# Patient Record
Sex: FEMALE | Race: WHITE | NOT HISPANIC OR LATINO | URBAN - METROPOLITAN AREA
[De-identification: names, ages, dates, MRNs, and addresses within clinical notes are randomized per-mention and may not be internally consistent; named-entity substitution may affect disease eponyms.]

---

## 2017-01-25 ENCOUNTER — OUTPATIENT (OUTPATIENT)
Dept: OUTPATIENT SERVICES | Facility: HOSPITAL | Age: 72
LOS: 1 days | Discharge: HOME | End: 2017-01-25

## 2017-06-27 DIAGNOSIS — K64.8 OTHER HEMORRHOIDS: ICD-10-CM

## 2017-06-27 DIAGNOSIS — Z80.0 FAMILY HISTORY OF MALIGNANT NEOPLASM OF DIGESTIVE ORGANS: ICD-10-CM

## 2017-06-27 DIAGNOSIS — E66.9 OBESITY, UNSPECIFIED: ICD-10-CM

## 2017-06-27 DIAGNOSIS — I10 ESSENTIAL (PRIMARY) HYPERTENSION: ICD-10-CM

## 2017-06-27 DIAGNOSIS — D12.2 BENIGN NEOPLASM OF ASCENDING COLON: ICD-10-CM

## 2017-06-27 DIAGNOSIS — Z12.11 ENCOUNTER FOR SCREENING FOR MALIGNANT NEOPLASM OF COLON: ICD-10-CM

## 2017-06-27 DIAGNOSIS — E78.00 PURE HYPERCHOLESTEROLEMIA, UNSPECIFIED: ICD-10-CM

## 2017-06-27 DIAGNOSIS — K57.30 DIVERTICULOSIS OF LARGE INTESTINE WITHOUT PERFORATION OR ABSCESS WITHOUT BLEEDING: ICD-10-CM

## 2017-06-27 DIAGNOSIS — I25.10 ATHEROSCLEROTIC HEART DISEASE OF NATIVE CORONARY ARTERY WITHOUT ANGINA PECTORIS: ICD-10-CM

## 2017-06-27 DIAGNOSIS — E11.9 TYPE 2 DIABETES MELLITUS WITHOUT COMPLICATIONS: ICD-10-CM

## 2018-01-29 ENCOUNTER — OUTPATIENT (OUTPATIENT)
Dept: OUTPATIENT SERVICES | Facility: HOSPITAL | Age: 73
LOS: 1 days | Discharge: HOME | End: 2018-01-29

## 2018-01-29 ENCOUNTER — APPOINTMENT (OUTPATIENT)
Dept: HEMATOLOGY ONCOLOGY | Facility: CLINIC | Age: 73
End: 2018-01-29

## 2018-01-29 VITALS
WEIGHT: 160 LBS | DIASTOLIC BLOOD PRESSURE: 80 MMHG | HEIGHT: 63 IN | SYSTOLIC BLOOD PRESSURE: 160 MMHG | BODY MASS INDEX: 28.35 KG/M2 | HEART RATE: 82 BPM | TEMPERATURE: 97.3 F | RESPIRATION RATE: 12 BRPM

## 2018-01-29 LAB
BASOPHILS # BLD: 0.03 TH/MM3
BASOPHILS NFR BLD: 0.3 %
EOSINOPHIL # BLD: 0.14 TH/MM3
EOSINOPHIL NFR BLD: 1.4 %
ERYTHROCYTE [DISTWIDTH] IN BLOOD BY AUTOMATED COUNT: 13.3 %
GRANULOCYTES # BLD: 5.44 TH/MM3
GRANULOCYTES NFR BLD: 55.8 %
HCT VFR BLD AUTO: 38.3 %
HGB BLD-MCNC: 12.9 G/DL
IMM GRANULOCYTES # BLD: 0.05 TH/MM3
IMM GRANULOCYTES NFR BLD: 0.5 %
LYMPHOCYTES # BLD: 3.58 TH/MM3
LYMPHOCYTES NFR BLD: 36.7 %
MCH RBC QN AUTO: 28.3 PG
MCHC RBC AUTO-ENTMCNC: 33.7 G/DL
MCV RBC AUTO: 84 FL
MONOCYTES # BLD: 0.52 TH/MM3
MONOCYTES NFR BLD: 5.3 %
PLATELET # BLD: 172 TH/MM3
PMV BLD AUTO: 10.4 FL
RBC # BLD AUTO: 4.56 MIL/MM3
WBC # BLD: 9.76 TH/MM3

## 2018-01-30 DIAGNOSIS — D47.2 MONOCLONAL GAMMOPATHY: ICD-10-CM

## 2018-01-30 LAB
ALBUMIN SERPL-MCNC: 4.5 G/DL
ALBUMIN/GLOB SERPL: 1.88
ALP SERPL-CCNC: 61 IU/L
ALT SERPL-CCNC: 70 IU/L
ANION GAP SERPL CALC-SCNC: 11 MEQ/L
AST SERPL-CCNC: 65 IU/L
BILIRUB SERPL-MCNC: 1.5 MG/DL
BUN SERPL-MCNC: 20 MG/DL
BUN/CREAT SERPL: 32.8 %
CALCIUM SERPL-MCNC: 10.2 MG/DL
CANCER AG125 SERPL-ACNC: 10 U/ML
CHLORIDE SERPL-SCNC: 103 MEQ/L
CO2 SERPL-SCNC: 24 MEQ/L
CREAT SERPL-MCNC: 0.61 MG/DL
GFR SERPL CREATININE-BSD FRML MDRD: 96
GLUCOSE SERPL-MCNC: 149 MG/DL
POTASSIUM SERPL-SCNC: 4.4 MMOL/L
PROT SERPL-MCNC: 6.9 G/DL
SODIUM SERPL-SCNC: 138 MEQ/L

## 2018-01-30 RX ORDER — ISOSORBIDE MONONITRATE 60 MG
60 TABLET, EXTENDED RELEASE 24 HR ORAL
Refills: 0 | Status: ACTIVE | COMMUNITY

## 2018-01-30 RX ORDER — ATORVASTATIN CALCIUM 20 MG/1
20 TABLET, FILM COATED ORAL
Refills: 0 | Status: ACTIVE | COMMUNITY

## 2018-01-30 RX ORDER — GLIMEPIRIDE 1 MG/1
1 TABLET ORAL
Refills: 0 | Status: ACTIVE | COMMUNITY

## 2018-01-30 RX ORDER — METOPROLOL TARTRATE 50 MG/1
50 TABLET ORAL
Refills: 0 | Status: ACTIVE | COMMUNITY

## 2018-01-30 RX ORDER — CLOPIDOGREL 75 MG/1
75 TABLET, FILM COATED ORAL
Refills: 0 | Status: ACTIVE | COMMUNITY

## 2018-01-30 RX ORDER — OMEGA-3/DHA/EPA/FISH OIL 300-1000MG
1000 CAPSULE,DELAYED RELEASE (ENTERIC COATED) ORAL
Refills: 0 | Status: ACTIVE | COMMUNITY

## 2018-01-30 RX ORDER — VALSARTAN 160 MG/1
160 TABLET ORAL
Refills: 0 | Status: ACTIVE | COMMUNITY

## 2018-01-30 RX ORDER — OMEPRAZOLE 40 MG/1
40 CAPSULE, DELAYED RELEASE ORAL
Refills: 0 | Status: ACTIVE | COMMUNITY

## 2018-01-30 RX ORDER — SITAGLIPTIN AND METFORMIN HYDROCHLORIDE 50; 1000 MG/1; MG/1
50-1000 TABLET, FILM COATED ORAL
Refills: 0 | Status: ACTIVE | COMMUNITY

## 2018-02-22 ENCOUNTER — EMERGENCY (EMERGENCY)
Facility: HOSPITAL | Age: 73
LOS: 0 days | Discharge: HOME | End: 2018-02-22
Attending: EMERGENCY MEDICINE | Admitting: INTERNAL MEDICINE

## 2018-02-22 VITALS
RESPIRATION RATE: 18 BRPM | DIASTOLIC BLOOD PRESSURE: 64 MMHG | SYSTOLIC BLOOD PRESSURE: 136 MMHG | TEMPERATURE: 96 F | HEART RATE: 84 BPM | OXYGEN SATURATION: 99 %

## 2018-02-22 VITALS
OXYGEN SATURATION: 98 % | SYSTOLIC BLOOD PRESSURE: 137 MMHG | TEMPERATURE: 98 F | RESPIRATION RATE: 18 BRPM | HEART RATE: 88 BPM | DIASTOLIC BLOOD PRESSURE: 78 MMHG

## 2018-02-22 DIAGNOSIS — R11.2 NAUSEA WITH VOMITING, UNSPECIFIED: ICD-10-CM

## 2018-02-22 DIAGNOSIS — R53.1 WEAKNESS: ICD-10-CM

## 2018-02-22 DIAGNOSIS — I25.10 ATHEROSCLEROTIC HEART DISEASE OF NATIVE CORONARY ARTERY WITHOUT ANGINA PECTORIS: ICD-10-CM

## 2018-02-22 DIAGNOSIS — R61 GENERALIZED HYPERHIDROSIS: ICD-10-CM

## 2018-02-22 DIAGNOSIS — I10 ESSENTIAL (PRIMARY) HYPERTENSION: ICD-10-CM

## 2018-02-22 DIAGNOSIS — Z79.84 LONG TERM (CURRENT) USE OF ORAL HYPOGLYCEMIC DRUGS: ICD-10-CM

## 2018-02-22 DIAGNOSIS — E78.00 PURE HYPERCHOLESTEROLEMIA, UNSPECIFIED: ICD-10-CM

## 2018-02-22 DIAGNOSIS — Z79.899 OTHER LONG TERM (CURRENT) DRUG THERAPY: ICD-10-CM

## 2018-02-22 DIAGNOSIS — E11.9 TYPE 2 DIABETES MELLITUS WITHOUT COMPLICATIONS: ICD-10-CM

## 2018-02-22 LAB
ALBUMIN SERPL ELPH-MCNC: 4.3 G/DL — SIGNIFICANT CHANGE UP (ref 3–5.5)
ALP SERPL-CCNC: 66 U/L — SIGNIFICANT CHANGE UP (ref 30–115)
ALT FLD-CCNC: 108 U/L — HIGH (ref 0–41)
ANION GAP SERPL CALC-SCNC: 12 MMOL/L — SIGNIFICANT CHANGE UP (ref 7–14)
AST SERPL-CCNC: 117 U/L — HIGH (ref 0–41)
B-TYPE NATRIURETIC PEPTIDE BNP RESULT: 29 PG/ML — SIGNIFICANT CHANGE UP (ref 0–99)
BASOPHILS # BLD AUTO: 0.02 K/UL — SIGNIFICANT CHANGE UP (ref 0–0.2)
BASOPHILS NFR BLD AUTO: 0.2 % — SIGNIFICANT CHANGE UP (ref 0–1)
BILIRUB SERPL-MCNC: 2.6 MG/DL — HIGH (ref 0.2–1.2)
BUN SERPL-MCNC: 22 MG/DL — HIGH (ref 10–20)
CALCIUM SERPL-MCNC: 9.6 MG/DL — SIGNIFICANT CHANGE UP (ref 8.5–10.1)
CHLORIDE SERPL-SCNC: 99 MMOL/L — SIGNIFICANT CHANGE UP (ref 98–110)
CK MB BLD-MCNC: 2 % — SIGNIFICANT CHANGE UP (ref 0–4)
CK MB CFR SERPL CALC: 1.3 NG/ML — SIGNIFICANT CHANGE UP (ref 0.6–6.3)
CK SERPL-CCNC: 53 U/L — SIGNIFICANT CHANGE UP (ref 0–225)
CO2 SERPL-SCNC: 19 MMOL/L — SIGNIFICANT CHANGE UP (ref 17–32)
CREAT SERPL-MCNC: 0.9 MG/DL — SIGNIFICANT CHANGE UP (ref 0.7–1.5)
EOSINOPHIL # BLD AUTO: 0.02 K/UL — SIGNIFICANT CHANGE UP (ref 0–0.7)
EOSINOPHIL NFR BLD AUTO: 0.2 % — SIGNIFICANT CHANGE UP (ref 0–8)
GLUCOSE SERPL-MCNC: 262 MG/DL — HIGH (ref 70–110)
HCT VFR BLD CALC: 39.4 % — SIGNIFICANT CHANGE UP (ref 37–47)
HGB BLD-MCNC: 13.6 G/DL — LOW (ref 14–18)
IMM GRANULOCYTES NFR BLD AUTO: 0.7 % — HIGH (ref 0.1–0.3)
LIDOCAIN IGE QN: 37 U/L — SIGNIFICANT CHANGE UP (ref 7–60)
LYMPHOCYTES # BLD AUTO: 1.01 K/UL — LOW (ref 1.2–3.4)
LYMPHOCYTES # BLD AUTO: 9.4 % — LOW (ref 20.5–51.1)
MCHC RBC-ENTMCNC: 28.3 PG — SIGNIFICANT CHANGE UP (ref 27–31)
MCHC RBC-ENTMCNC: 34.5 G/DL — SIGNIFICANT CHANGE UP (ref 32–37)
MCV RBC AUTO: 82.1 FL — SIGNIFICANT CHANGE UP (ref 81–91)
MONOCYTES # BLD AUTO: 0.25 K/UL — SIGNIFICANT CHANGE UP (ref 0.1–0.6)
MONOCYTES NFR BLD AUTO: 2.3 % — SIGNIFICANT CHANGE UP (ref 1.7–9.3)
NEUTROPHILS # BLD AUTO: 9.39 K/UL — HIGH (ref 1.4–6.5)
NEUTROPHILS NFR BLD AUTO: 87.2 % — HIGH (ref 42.2–75.2)
PLATELET # BLD AUTO: 167 K/UL — SIGNIFICANT CHANGE UP (ref 130–400)
POTASSIUM SERPL-MCNC: 4.8 MMOL/L — SIGNIFICANT CHANGE UP (ref 3.5–5)
POTASSIUM SERPL-SCNC: 4.8 MMOL/L — SIGNIFICANT CHANGE UP (ref 3.5–5)
PROT SERPL-MCNC: 7.1 G/DL — SIGNIFICANT CHANGE UP (ref 6–8)
RBC # BLD: 4.8 M/UL — SIGNIFICANT CHANGE UP (ref 4.2–5.4)
RBC # FLD: 12.8 % — SIGNIFICANT CHANGE UP (ref 11.5–14.5)
SODIUM SERPL-SCNC: 130 MMOL/L — LOW (ref 135–146)
TROPONIN I SERPL-MCNC: <0.02 NG/ML — SIGNIFICANT CHANGE UP (ref 0–0.05)
WBC # BLD: 10.76 K/UL — SIGNIFICANT CHANGE UP (ref 4.8–10.8)
WBC # FLD AUTO: 10.76 K/UL — SIGNIFICANT CHANGE UP (ref 4.8–10.8)

## 2018-02-22 NOTE — ED PROVIDER NOTE - PROGRESS NOTE DETAILS
Family at bedside, want to take pt home. Pt is at baseline mental status. No vomiting in the ED. Tolerating PO. Labs reviewed. Family is refusing CT. Will follow up with PMD and cardiology. Will discharge.

## 2018-02-22 NOTE — ED ADULT TRIAGE NOTE - CHIEF COMPLAINT QUOTE
[facility] from [facility], pt was lethargic, +vomiting, was pale and diaphoretic, given juice and now pt back to herself

## 2018-02-22 NOTE — ED PROVIDER NOTE - OBJECTIVE STATEMENT
72 y F pmh including pmh including CAD, DM, colon and ovarian ca, presents w / nausea and vomiting noted today and at a class for adult developmentally delayed, they noted that patient had an episode of seeming not herself. patient says she just didn't feel well. no cp or sob. no fever.

## 2018-02-22 NOTE — ED PROVIDER NOTE - PHYSICAL EXAMINATION
CONSTITUTIONAL: Well-developed; well-nourished; in no acute distress.   SKIN: warm, dry  HEAD: Normocephalic; atraumatic.  EYES: no conj injection  ENT: No nasal discharge; airway clear.  NECK: Supple; non tender.  CARD: S1, S2 normal; no murmurs, gallops, or rubs. Regular rate and rhythm.   RESP: No wheezes, rales or rhonchi.  ABD: soft ntnd  EXT: Normal ROM.  No clubbing, cyanosis or edema.   LYMPH: No acute cervical adenopathy.  NEURO: Alert, oriented, grossly unremarkable  PSYCH: Cooperative, appropriate.

## 2018-02-22 NOTE — ED PROVIDER NOTE - NS ED ROS FT
Eyes:  No visual changes  ENMT:  No hearing changes  Cardiac:  No chest pain, SOB   Respiratory:  No cough or respiratory distress  GI:  see hpi  :  No dysuria  MS:  No back pain.  Neuro:  No headache  Skin:  No skin rash.   Endocrine: No history of thyroid disease

## 2018-02-22 NOTE — ED ADULT NURSE NOTE - OBJECTIVE STATEMENT
Pt states she was on a trip and began to become diaphoretic and was lowered to floor, pt was given glucerna & orange juice and fs checked 280 at scene and pt vomited nonbloody emesis after. Pt alert and orientedx3, VSS and afebrile. Safety maintained and hourly rounding performed. Will continue with plan of care. fs now 257.

## 2018-02-22 NOTE — ED ADULT NURSE REASSESSMENT NOTE - NS ED NURSE REASSESS COMMENT FT1
Pts alert, fs 257 at this time. no active vomiting. VSS and afebrile. explained wait of time to pt and family.  Safety maintained and hourly rounding performed. Will continue with plan of care.

## 2018-02-22 NOTE — ED PROVIDER NOTE - ATTENDING CONTRIBUTION TO CARE
72 y.o. female, PMH noted, sent from "[facility]" for evaluation after she had an episode where she turned pale and diaphoretic, was given juice and glucose tablet. Symptoms improved after that, but she vomited once. Pt denies any complains at this time. Pt states she took her Janumet and ate breakfast, but vomited once after breakfast. On exam, pt is awake and alert, lungs CTA B/L, CV S1S2 regular, abdomen soft/NT/ND/(+)BS, ext (-) edema. Will do labs, UA, CT, and reevaluate.

## 2018-04-09 ENCOUNTER — APPOINTMENT (OUTPATIENT)
Dept: SURGERY | Facility: CLINIC | Age: 73
End: 2018-04-09

## 2018-04-18 ENCOUNTER — OUTPATIENT (OUTPATIENT)
Dept: OUTPATIENT SERVICES | Facility: HOSPITAL | Age: 73
LOS: 1 days | Discharge: HOME | End: 2018-04-18

## 2018-04-18 VITALS
DIASTOLIC BLOOD PRESSURE: 72 MMHG | SYSTOLIC BLOOD PRESSURE: 141 MMHG | TEMPERATURE: 97 F | WEIGHT: 160.06 LBS | RESPIRATION RATE: 18 BRPM | OXYGEN SATURATION: 97 % | HEIGHT: 60 IN

## 2018-04-18 DIAGNOSIS — E66.9 OBESITY, UNSPECIFIED: ICD-10-CM

## 2018-04-18 DIAGNOSIS — I10 ESSENTIAL (PRIMARY) HYPERTENSION: ICD-10-CM

## 2018-04-18 DIAGNOSIS — I25.10 ATHEROSCLEROTIC HEART DISEASE OF NATIVE CORONARY ARTERY WITHOUT ANGINA PECTORIS: ICD-10-CM

## 2018-04-18 DIAGNOSIS — C80.1 MALIGNANT (PRIMARY) NEOPLASM, UNSPECIFIED: ICD-10-CM

## 2018-04-18 DIAGNOSIS — R22.9 LOCALIZED SWELLING, MASS AND LUMP, UNSPECIFIED: ICD-10-CM

## 2018-04-18 DIAGNOSIS — E78.00 PURE HYPERCHOLESTEROLEMIA, UNSPECIFIED: ICD-10-CM

## 2018-04-18 DIAGNOSIS — I21.3 ST ELEVATION (STEMI) MYOCARDIAL INFARCTION OF UNSPECIFIED SITE: ICD-10-CM

## 2018-04-18 DIAGNOSIS — E11.9 TYPE 2 DIABETES MELLITUS WITHOUT COMPLICATIONS: ICD-10-CM

## 2018-04-18 DIAGNOSIS — K21.9 GASTRO-ESOPHAGEAL REFLUX DISEASE WITHOUT ESOPHAGITIS: ICD-10-CM

## 2018-04-18 DIAGNOSIS — Z01.818 ENCOUNTER FOR OTHER PREPROCEDURAL EXAMINATION: ICD-10-CM

## 2018-04-18 DIAGNOSIS — Z90.710 ACQUIRED ABSENCE OF BOTH CERVIX AND UTERUS: Chronic | ICD-10-CM

## 2018-04-18 LAB
ALBUMIN SERPL ELPH-MCNC: 4.8 G/DL — SIGNIFICANT CHANGE UP (ref 3.5–5.2)
ALP SERPL-CCNC: 78 U/L — SIGNIFICANT CHANGE UP (ref 30–115)
ALT FLD-CCNC: 95 U/L — HIGH (ref 0–41)
ANION GAP SERPL CALC-SCNC: 15 MMOL/L — HIGH (ref 7–14)
APPEARANCE UR: CLEAR — SIGNIFICANT CHANGE UP
APTT BLD: 26.2 SEC — LOW (ref 27–39.2)
AST SERPL-CCNC: 76 U/L — HIGH (ref 0–41)
BASOPHILS # BLD AUTO: 0.05 K/UL — SIGNIFICANT CHANGE UP (ref 0–0.2)
BASOPHILS NFR BLD AUTO: 0.6 % — SIGNIFICANT CHANGE UP (ref 0–1)
BILIRUB SERPL-MCNC: 1.5 MG/DL — HIGH (ref 0.2–1.2)
BILIRUB UR-MCNC: NEGATIVE — SIGNIFICANT CHANGE UP
BUN SERPL-MCNC: 22 MG/DL — HIGH (ref 10–20)
CALCIUM SERPL-MCNC: 9.9 MG/DL — SIGNIFICANT CHANGE UP (ref 8.5–10.1)
CHLORIDE SERPL-SCNC: 102 MMOL/L — SIGNIFICANT CHANGE UP (ref 98–110)
CO2 SERPL-SCNC: 24 MMOL/L — SIGNIFICANT CHANGE UP (ref 17–32)
COLOR SPEC: YELLOW — SIGNIFICANT CHANGE UP
CREAT SERPL-MCNC: 0.7 MG/DL — SIGNIFICANT CHANGE UP (ref 0.7–1.5)
DIFF PNL FLD: (no result)
EOSINOPHIL # BLD AUTO: 0.12 K/UL — SIGNIFICANT CHANGE UP (ref 0–0.7)
EOSINOPHIL NFR BLD AUTO: 1.4 % — SIGNIFICANT CHANGE UP (ref 0–8)
GLUCOSE SERPL-MCNC: 118 MG/DL — HIGH (ref 70–99)
GLUCOSE UR QL: 100 MG/DL
HCT VFR BLD CALC: 39.7 % — SIGNIFICANT CHANGE UP (ref 37–47)
HGB BLD-MCNC: 13.4 G/DL — SIGNIFICANT CHANGE UP (ref 12–16)
IMM GRANULOCYTES NFR BLD AUTO: 0.8 % — HIGH (ref 0.1–0.3)
INR BLD: 0.99 RATIO — SIGNIFICANT CHANGE UP (ref 0.65–1.3)
KETONES UR-MCNC: NEGATIVE — SIGNIFICANT CHANGE UP
LEUKOCYTE ESTERASE UR-ACNC: NEGATIVE — SIGNIFICANT CHANGE UP
LYMPHOCYTES # BLD AUTO: 2.97 K/UL — SIGNIFICANT CHANGE UP (ref 1.2–3.4)
LYMPHOCYTES # BLD AUTO: 34.5 % — SIGNIFICANT CHANGE UP (ref 20.5–51.1)
MCHC RBC-ENTMCNC: 28.3 PG — SIGNIFICANT CHANGE UP (ref 27–31)
MCHC RBC-ENTMCNC: 33.8 G/DL — SIGNIFICANT CHANGE UP (ref 32–37)
MCV RBC AUTO: 83.8 FL — SIGNIFICANT CHANGE UP (ref 81–99)
MONOCYTES # BLD AUTO: 0.41 K/UL — SIGNIFICANT CHANGE UP (ref 0.1–0.6)
MONOCYTES NFR BLD AUTO: 4.8 % — SIGNIFICANT CHANGE UP (ref 1.7–9.3)
NEUTROPHILS # BLD AUTO: 4.98 K/UL — SIGNIFICANT CHANGE UP (ref 1.4–6.5)
NEUTROPHILS NFR BLD AUTO: 57.9 % — SIGNIFICANT CHANGE UP (ref 42.2–75.2)
NITRITE UR-MCNC: NEGATIVE — SIGNIFICANT CHANGE UP
PH UR: 5.5 — SIGNIFICANT CHANGE UP (ref 5–8)
PLATELET # BLD AUTO: 192 K/UL — SIGNIFICANT CHANGE UP (ref 130–400)
POTASSIUM SERPL-MCNC: 4.9 MMOL/L — SIGNIFICANT CHANGE UP (ref 3.5–5)
POTASSIUM SERPL-SCNC: 4.9 MMOL/L — SIGNIFICANT CHANGE UP (ref 3.5–5)
PROT SERPL-MCNC: 7.6 G/DL — SIGNIFICANT CHANGE UP (ref 6–8)
PROT UR-MCNC: NEGATIVE MG/DL — SIGNIFICANT CHANGE UP
PROTHROM AB SERPL-ACNC: 10.7 SEC — SIGNIFICANT CHANGE UP (ref 9.95–12.87)
RBC # BLD: 4.74 M/UL — SIGNIFICANT CHANGE UP (ref 4.2–5.4)
RBC # FLD: 12.7 % — SIGNIFICANT CHANGE UP (ref 11.5–14.5)
SODIUM SERPL-SCNC: 141 MMOL/L — SIGNIFICANT CHANGE UP (ref 135–146)
SP GR SPEC: 1.02 — SIGNIFICANT CHANGE UP (ref 1.01–1.03)
UROBILINOGEN FLD QL: 0.2 MG/DL — SIGNIFICANT CHANGE UP (ref 0.2–0.2)
WBC # BLD: 8.6 K/UL — SIGNIFICANT CHANGE UP (ref 4.8–10.8)
WBC # FLD AUTO: 8.6 K/UL — SIGNIFICANT CHANGE UP (ref 4.8–10.8)

## 2018-04-18 RX ORDER — ATORVASTATIN CALCIUM 80 MG/1
1 TABLET, FILM COATED ORAL
Qty: 0 | Refills: 0 | COMMUNITY

## 2018-04-18 RX ORDER — SITAGLIPTIN AND METFORMIN HYDROCHLORIDE 500; 50 MG/1; MG/1
1 TABLET, FILM COATED ORAL
Qty: 0 | Refills: 0 | COMMUNITY

## 2018-04-18 RX ORDER — METOPROLOL TARTRATE 50 MG
1 TABLET ORAL
Qty: 0 | Refills: 0 | COMMUNITY

## 2018-04-18 NOTE — H&P PST ADULT - NSANTHOSAYNRD_GEN_A_CORE
No. DANIEL screening performed.  STOP BANG Legend: 0-2 = LOW Risk; 3-4 = INTERMEDIATE Risk; 5-8 = HIGH Risk

## 2018-04-18 NOTE — H&P PST ADULT - PMH
Abnormal liver function test    Cancer  Ovarian cancer s/p chemo 2012  Coronary artery disease involving native coronary artery of native heart, angina presence unspecified  MI  2011  Diabetes    Gastroesophageal reflux disease without esophagitis    High cholesterol    HTN (hypertension)    Obesity (BMI 30-39.9)

## 2018-04-18 NOTE — H&P PST ADULT - HISTORY OF PRESENT ILLNESS
72 y/o female accompanied by sister in law (who provided history),  reports h/o ovarian cancer with routine CT abd f/u - found to have abdominal nodules; elected for procedure. Denies chest pain, palp, SOB, cough, fever, recent URI/UTI.  Denies DONALDSON - goes to [facility] once a week.  Sister in law reports she is primary care taker for patient; that patient "is just a little slow", and that patient has been [facility]  [facility] 10[facility] yrs. Patient was seen and treated in [facility] ED for "low glucose." CXR / Labs done.   Last Stress test 2016: no ischemia.

## 2018-04-18 NOTE — H&P PST ADULT - FAMILY HISTORY
Mother  Still living? Unknown  Cancer, colon, Age at diagnosis: Age Unknown     Father  Still living? Unknown  Cancer, Age at diagnosis: Age Unknown     Sibling  Still living? Unknown  Cancer, Age at diagnosis: Age Unknown

## 2018-04-27 ENCOUNTER — RESULT REVIEW (OUTPATIENT)
Age: 73
End: 2018-04-27

## 2018-04-27 ENCOUNTER — OUTPATIENT (OUTPATIENT)
Dept: OUTPATIENT SERVICES | Facility: HOSPITAL | Age: 73
LOS: 1 days | Discharge: HOME | End: 2018-04-27

## 2018-04-27 VITALS
TEMPERATURE: 99 F | DIASTOLIC BLOOD PRESSURE: 88 MMHG | WEIGHT: 160.06 LBS | SYSTOLIC BLOOD PRESSURE: 170 MMHG | HEART RATE: 60 BPM | RESPIRATION RATE: 18 BRPM

## 2018-04-27 VITALS — HEART RATE: 60 BPM | DIASTOLIC BLOOD PRESSURE: 55 MMHG | SYSTOLIC BLOOD PRESSURE: 106 MMHG | RESPIRATION RATE: 18 BRPM

## 2018-04-27 DIAGNOSIS — Z90.710 ACQUIRED ABSENCE OF BOTH CERVIX AND UTERUS: Chronic | ICD-10-CM

## 2018-04-27 RX ORDER — ISOSORBIDE MONONITRATE 60 MG/1
1 TABLET, EXTENDED RELEASE ORAL
Qty: 0 | Refills: 0 | COMMUNITY

## 2018-04-27 RX ORDER — GLIMEPIRIDE 1 MG
1 TABLET ORAL
Qty: 0 | Refills: 0 | COMMUNITY

## 2018-04-27 RX ORDER — ASPIRIN/CALCIUM CARB/MAGNESIUM 324 MG
1 TABLET ORAL
Qty: 0 | Refills: 0 | COMMUNITY

## 2018-04-27 RX ORDER — ATORVASTATIN CALCIUM 80 MG/1
1 TABLET, FILM COATED ORAL
Qty: 0 | Refills: 0 | COMMUNITY

## 2018-04-27 RX ORDER — METOPROLOL TARTRATE 50 MG
1 TABLET ORAL
Qty: 0 | Refills: 0 | COMMUNITY

## 2018-04-27 RX ORDER — SODIUM CHLORIDE 9 MG/ML
1000 INJECTION, SOLUTION INTRAVENOUS
Qty: 0 | Refills: 0 | Status: DISCONTINUED | OUTPATIENT
Start: 2018-04-27 | End: 2018-05-12

## 2018-04-27 RX ORDER — OMEGA-3 ACID ETHYL ESTERS 1 G
1 CAPSULE ORAL
Qty: 0 | Refills: 0 | COMMUNITY

## 2018-04-27 RX ORDER — SITAGLIPTIN AND METFORMIN HYDROCHLORIDE 500; 50 MG/1; MG/1
1 TABLET, FILM COATED ORAL
Qty: 0 | Refills: 0 | COMMUNITY

## 2018-04-27 RX ORDER — ACETAMINOPHEN WITH CODEINE 300MG-30MG
1 TABLET ORAL
Qty: 12 | Refills: 0 | OUTPATIENT
Start: 2018-04-27 | End: 2018-04-29

## 2018-04-27 RX ORDER — VALSARTAN 80 MG/1
1 TABLET ORAL
Qty: 0 | Refills: 0 | COMMUNITY

## 2018-04-27 RX ORDER — CLOPIDOGREL BISULFATE 75 MG/1
1 TABLET, FILM COATED ORAL
Qty: 0 | Refills: 0 | COMMUNITY

## 2018-04-27 RX ORDER — OMEPRAZOLE 10 MG/1
1 CAPSULE, DELAYED RELEASE ORAL
Qty: 0 | Refills: 0 | COMMUNITY

## 2018-04-27 RX ORDER — ONDANSETRON 8 MG/1
4 TABLET, FILM COATED ORAL ONCE
Qty: 0 | Refills: 0 | Status: DISCONTINUED | OUTPATIENT
Start: 2018-04-27 | End: 2018-05-12

## 2018-04-27 RX ORDER — MORPHINE SULFATE 50 MG/1
2 CAPSULE, EXTENDED RELEASE ORAL
Qty: 0 | Refills: 0 | Status: DISCONTINUED | OUTPATIENT
Start: 2018-04-27 | End: 2018-04-27

## 2018-04-27 RX ORDER — OXYCODONE AND ACETAMINOPHEN 5; 325 MG/1; MG/1
1 TABLET ORAL ONCE
Qty: 0 | Refills: 0 | Status: DISCONTINUED | OUTPATIENT
Start: 2018-04-27 | End: 2018-04-27

## 2018-04-27 RX ADMIN — SODIUM CHLORIDE 50 MILLILITER(S): 9 INJECTION, SOLUTION INTRAVENOUS at 14:03

## 2018-04-27 NOTE — BRIEF OPERATIVE NOTE - PROCEDURE
<<-----Click on this checkbox to enter Procedure Excision of mass of abdomen  04/27/2018  Excision of three abdominal wall masses.  Active  TDINITTO

## 2018-04-27 NOTE — ASU DISCHARGE PLAN (ADULT/PEDIATRIC). - DRESSING FT
Keep area clean & dry;On sunday ok to shower & pat dry;keep paper strips in place till they fall off

## 2018-05-04 LAB — SURGICAL PATHOLOGY STUDY: SIGNIFICANT CHANGE UP

## 2018-05-07 DIAGNOSIS — Z79.84 LONG TERM (CURRENT) USE OF ORAL HYPOGLYCEMIC DRUGS: ICD-10-CM

## 2018-05-07 DIAGNOSIS — K21.9 GASTRO-ESOPHAGEAL REFLUX DISEASE WITHOUT ESOPHAGITIS: ICD-10-CM

## 2018-05-07 DIAGNOSIS — Z90.710 ACQUIRED ABSENCE OF BOTH CERVIX AND UTERUS: ICD-10-CM

## 2018-05-07 DIAGNOSIS — I25.119 ATHEROSCLEROTIC HEART DISEASE OF NATIVE CORONARY ARTERY WITH UNSPECIFIED ANGINA PECTORIS: ICD-10-CM

## 2018-05-07 DIAGNOSIS — Z80.0 FAMILY HISTORY OF MALIGNANT NEOPLASM OF DIGESTIVE ORGANS: ICD-10-CM

## 2018-05-07 DIAGNOSIS — Z85.43 PERSONAL HISTORY OF MALIGNANT NEOPLASM OF OVARY: ICD-10-CM

## 2018-05-07 DIAGNOSIS — Z90.722 ACQUIRED ABSENCE OF OVARIES, BILATERAL: ICD-10-CM

## 2018-05-07 DIAGNOSIS — Z79.02 LONG TERM (CURRENT) USE OF ANTITHROMBOTICS/ANTIPLATELETS: ICD-10-CM

## 2018-05-07 DIAGNOSIS — E11.9 TYPE 2 DIABETES MELLITUS WITHOUT COMPLICATIONS: ICD-10-CM

## 2018-05-07 DIAGNOSIS — E66.9 OBESITY, UNSPECIFIED: ICD-10-CM

## 2018-05-07 DIAGNOSIS — Z79.82 LONG TERM (CURRENT) USE OF ASPIRIN: ICD-10-CM

## 2018-05-07 DIAGNOSIS — I10 ESSENTIAL (PRIMARY) HYPERTENSION: ICD-10-CM

## 2018-05-07 DIAGNOSIS — E78.00 PURE HYPERCHOLESTEROLEMIA, UNSPECIFIED: ICD-10-CM

## 2018-05-07 DIAGNOSIS — I25.2 OLD MYOCARDIAL INFARCTION: ICD-10-CM

## 2018-05-07 DIAGNOSIS — Z92.21 PERSONAL HISTORY OF ANTINEOPLASTIC CHEMOTHERAPY: ICD-10-CM

## 2018-05-07 DIAGNOSIS — C79.2 SECONDARY MALIGNANT NEOPLASM OF SKIN: ICD-10-CM

## 2018-05-07 DIAGNOSIS — R19.00 INTRA-ABDOMINAL AND PELVIC SWELLING, MASS AND LUMP, UNSPECIFIED SITE: ICD-10-CM

## 2018-05-21 ENCOUNTER — APPOINTMENT (OUTPATIENT)
Dept: HEMATOLOGY ONCOLOGY | Facility: CLINIC | Age: 73
End: 2018-05-21

## 2018-05-21 ENCOUNTER — LABORATORY RESULT (OUTPATIENT)
Age: 73
End: 2018-05-21

## 2018-05-21 VITALS
WEIGHT: 161 LBS | TEMPERATURE: 95.7 F | DIASTOLIC BLOOD PRESSURE: 56 MMHG | HEART RATE: 67 BPM | BODY MASS INDEX: 28.53 KG/M2 | RESPIRATION RATE: 14 BRPM | SYSTOLIC BLOOD PRESSURE: 99 MMHG | HEIGHT: 63 IN

## 2018-05-21 DIAGNOSIS — E11.9 TYPE 2 DIABETES MELLITUS W/OUT COMPLICATIONS: ICD-10-CM

## 2018-05-21 DIAGNOSIS — F79 UNSPECIFIED INTELLECTUAL DISABILITIES: ICD-10-CM

## 2018-05-21 DIAGNOSIS — I25.10 ATHEROSCLEROTIC HEART DISEASE OF NATIVE CORONARY ARTERY W/OUT ANGINA PECTORIS: ICD-10-CM

## 2018-05-21 DIAGNOSIS — I10 ESSENTIAL (PRIMARY) HYPERTENSION: ICD-10-CM

## 2018-05-21 LAB
HCT VFR BLD CALC: 38.6 %
HGB BLD-MCNC: 13 G/DL
MCHC RBC-ENTMCNC: 28.1 PG
MCHC RBC-ENTMCNC: 33.7 G/DL
MCV RBC AUTO: 83.5 FL
PLATELET # BLD AUTO: 155 K/UL
PMV BLD: 10.4 FL
RBC # BLD: 4.62 M/UL
RBC # FLD: 12.9 %
WBC # FLD AUTO: 9 K/UL

## 2018-05-22 PROBLEM — I25.10 CORONARY ARTERY DISEASE, ANGINA PRESENCE UNSPECIFIED, UNSPECIFIED VESSEL OR LESION TYPE, UNSPECIFIED WHETHER NATIVE OR TRANSPLANTED HEART: Status: ACTIVE | Noted: 2018-01-29

## 2018-05-22 PROBLEM — E11.9 DIABETES MELLITUS: Status: ACTIVE | Noted: 2018-01-29

## 2018-05-22 PROBLEM — F79 INTELLECTUAL DISABILITY: Status: ACTIVE | Noted: 2018-01-29

## 2018-05-22 PROBLEM — I10 HYPERTENSION, UNSPECIFIED TYPE: Status: ACTIVE | Noted: 2018-01-29

## 2018-05-22 LAB
ALBUMIN SERPL ELPH-MCNC: 4.7 G/DL
ALP BLD-CCNC: 77 U/L
ALT SERPL-CCNC: 58 U/L
ANION GAP SERPL CALC-SCNC: 18 MMOL/L
AST SERPL-CCNC: 50 U/L
BILIRUB SERPL-MCNC: 1.4 MG/DL
BUN SERPL-MCNC: 21 MG/DL
CALCIUM SERPL-MCNC: 10.4 MG/DL
CANCER AG125 SERPL-ACNC: 11 U/ML
CHLORIDE SERPL-SCNC: 99 MMOL/L
CO2 SERPL-SCNC: 24 MMOL/L
CREAT SERPL-MCNC: 0.7 MG/DL
GLUCOSE SERPL-MCNC: 127 MG/DL
POTASSIUM SERPL-SCNC: 4.2 MMOL/L
PROT SERPL-MCNC: 7.3 G/DL
SODIUM SERPL-SCNC: 141 MMOL/L

## 2018-06-15 ENCOUNTER — LABORATORY RESULT (OUTPATIENT)
Age: 73
End: 2018-06-15

## 2018-06-15 ENCOUNTER — RX RENEWAL (OUTPATIENT)
Age: 73
End: 2018-06-15

## 2018-06-15 ENCOUNTER — APPOINTMENT (OUTPATIENT)
Dept: INFUSION THERAPY | Facility: CLINIC | Age: 73
End: 2018-06-15

## 2018-06-15 ENCOUNTER — APPOINTMENT (OUTPATIENT)
Dept: HEMATOLOGY ONCOLOGY | Facility: CLINIC | Age: 73
End: 2018-06-15

## 2018-06-15 LAB
HCT VFR BLD CALC: 38.4 %
HGB BLD-MCNC: 12.8 G/DL
MCHC RBC-ENTMCNC: 28.3 PG
MCHC RBC-ENTMCNC: 33.3 G/DL
MCV RBC AUTO: 84.8 FL
PLATELET # BLD AUTO: 141 K/UL
PMV BLD: 11 FL
RBC # BLD: 4.53 M/UL
RBC # FLD: 12.7 %
WBC # FLD AUTO: 7.74 K/UL

## 2018-06-15 RX ORDER — DIPHENHYDRAMINE HCL 50 MG
50 CAPSULE ORAL ONCE
Qty: 0 | Refills: 0 | Status: COMPLETED | OUTPATIENT
Start: 2018-06-15 | End: 2018-06-15

## 2018-06-15 RX ORDER — ONDANSETRON 8 MG/1
8 TABLET ORAL
Qty: 30 | Refills: 3 | Status: ACTIVE | COMMUNITY
Start: 2018-06-15 | End: 1900-01-01

## 2018-06-15 RX ORDER — PROCHLORPERAZINE MALEATE 10 MG/1
10 TABLET ORAL EVERY 6 HOURS
Qty: 385 | Refills: 3 | Status: ACTIVE | COMMUNITY
Start: 2018-06-15 | End: 1900-01-01

## 2018-06-15 RX ORDER — FAMOTIDINE 10 MG/ML
20 INJECTION INTRAVENOUS ONCE
Qty: 0 | Refills: 0 | Status: COMPLETED | OUTPATIENT
Start: 2018-06-15 | End: 2018-06-15

## 2018-06-15 RX ORDER — PACLITAXEL 6 MG/ML
125 INJECTION, SOLUTION, CONCENTRATE INTRAVENOUS ONCE
Qty: 0 | Refills: 0 | Status: COMPLETED | OUTPATIENT
Start: 2018-06-15 | End: 2018-06-15

## 2018-06-15 RX ORDER — DEXAMETHASONE 0.5 MG/5ML
12 ELIXIR ORAL ONCE
Qty: 0 | Refills: 0 | Status: COMPLETED | OUTPATIENT
Start: 2018-06-15 | End: 2018-06-15

## 2018-06-15 RX ORDER — CARBOPLATIN 50 MG
315 VIAL (EA) INTRAVENOUS ONCE
Qty: 0 | Refills: 0 | Status: COMPLETED | OUTPATIENT
Start: 2018-06-15 | End: 2018-06-15

## 2018-06-15 RX ORDER — ACETAMINOPHEN 500 MG
650 TABLET ORAL ONCE
Qty: 0 | Refills: 0 | Status: COMPLETED | OUTPATIENT
Start: 2018-06-15 | End: 2018-06-15

## 2018-06-15 RX ADMIN — FAMOTIDINE 156 MILLIGRAM(S): 10 INJECTION INTRAVENOUS at 11:00

## 2018-06-15 RX ADMIN — Medication 281.5 MILLIGRAM(S): at 11:01

## 2018-06-15 RX ADMIN — Medication 183 MILLIGRAM(S): at 11:00

## 2018-06-15 RX ADMIN — Medication 650 MILLIGRAM(S): at 11:01

## 2018-06-15 RX ADMIN — PACLITAXEL 270.83 MILLIGRAM(S): 6 INJECTION, SOLUTION, CONCENTRATE INTRAVENOUS at 11:02

## 2018-06-15 RX ADMIN — Medication 153 MILLIGRAM(S): at 11:00

## 2018-06-21 ENCOUNTER — LABORATORY RESULT (OUTPATIENT)
Age: 73
End: 2018-06-21

## 2018-06-21 ENCOUNTER — APPOINTMENT (OUTPATIENT)
Dept: INFUSION THERAPY | Facility: CLINIC | Age: 73
End: 2018-06-21

## 2018-06-21 ENCOUNTER — APPOINTMENT (OUTPATIENT)
Dept: HEMATOLOGY ONCOLOGY | Facility: CLINIC | Age: 73
End: 2018-06-21

## 2018-06-21 VITALS
HEART RATE: 68 BPM | RESPIRATION RATE: 18 BRPM | TEMPERATURE: 96.6 F | SYSTOLIC BLOOD PRESSURE: 141 MMHG | DIASTOLIC BLOOD PRESSURE: 67 MMHG

## 2018-06-21 LAB
HCT VFR BLD CALC: 37 %
HGB BLD-MCNC: 12.6 G/DL
MCHC RBC-ENTMCNC: 28.4 PG
MCHC RBC-ENTMCNC: 34.1 G/DL
MCV RBC AUTO: 83.5 FL
PLATELET # BLD AUTO: 145 K/UL
PMV BLD: 11.3 FL
RBC # BLD: 4.43 M/UL
RBC # FLD: 12.4 %
WBC # FLD AUTO: 6.06 K/UL

## 2018-06-21 RX ORDER — CARBOPLATIN 50 MG
315 VIAL (EA) INTRAVENOUS ONCE
Qty: 0 | Refills: 0 | Status: COMPLETED | OUTPATIENT
Start: 2018-06-21 | End: 2018-06-21

## 2018-06-21 RX ORDER — ACETAMINOPHEN 500 MG
650 TABLET ORAL ONCE
Qty: 0 | Refills: 0 | Status: COMPLETED | OUTPATIENT
Start: 2018-06-21 | End: 2018-06-21

## 2018-06-21 RX ORDER — FAMOTIDINE 10 MG/ML
20 INJECTION INTRAVENOUS ONCE
Qty: 0 | Refills: 0 | Status: COMPLETED | OUTPATIENT
Start: 2018-06-21 | End: 2018-06-21

## 2018-06-21 RX ORDER — DEXAMETHASONE 0.5 MG/5ML
12 ELIXIR ORAL ONCE
Qty: 0 | Refills: 0 | Status: COMPLETED | OUTPATIENT
Start: 2018-06-21 | End: 2018-06-21

## 2018-06-21 RX ORDER — DIPHENHYDRAMINE HCL 50 MG
50 CAPSULE ORAL ONCE
Qty: 0 | Refills: 0 | Status: COMPLETED | OUTPATIENT
Start: 2018-06-21 | End: 2018-06-21

## 2018-06-21 RX ORDER — PACLITAXEL 6 MG/ML
125 INJECTION, SOLUTION, CONCENTRATE INTRAVENOUS ONCE
Qty: 0 | Refills: 0 | Status: COMPLETED | OUTPATIENT
Start: 2018-06-21 | End: 2018-06-21

## 2018-06-21 RX ADMIN — Medication 650 MILLIGRAM(S): at 09:57

## 2018-06-21 RX ADMIN — PACLITAXEL 270.83 MILLIGRAM(S): 6 INJECTION, SOLUTION, CONCENTRATE INTRAVENOUS at 10:33

## 2018-06-21 RX ADMIN — Medication 281.5 MILLIGRAM(S): at 10:34

## 2018-06-21 RX ADMIN — Medication 650 MILLIGRAM(S): at 10:34

## 2018-06-21 RX ADMIN — Medication 183 MILLIGRAM(S): at 09:57

## 2018-06-21 RX ADMIN — Medication 153 MILLIGRAM(S): at 09:57

## 2018-06-21 RX ADMIN — FAMOTIDINE 156 MILLIGRAM(S): 10 INJECTION INTRAVENOUS at 09:57

## 2018-06-22 ENCOUNTER — APPOINTMENT (OUTPATIENT)
Dept: HEMATOLOGY ONCOLOGY | Facility: CLINIC | Age: 73
End: 2018-06-22

## 2018-06-22 ENCOUNTER — APPOINTMENT (OUTPATIENT)
Dept: INFUSION THERAPY | Facility: CLINIC | Age: 73
End: 2018-06-22

## 2018-06-27 ENCOUNTER — LABORATORY RESULT (OUTPATIENT)
Age: 73
End: 2018-06-27

## 2018-06-27 ENCOUNTER — APPOINTMENT (OUTPATIENT)
Dept: HEMATOLOGY ONCOLOGY | Facility: CLINIC | Age: 73
End: 2018-06-27

## 2018-06-27 ENCOUNTER — OTHER (OUTPATIENT)
Age: 73
End: 2018-06-27

## 2018-06-28 LAB
HCT VFR BLD CALC: 35.6 %
HGB BLD-MCNC: 12.2 G/DL
MCHC RBC-ENTMCNC: 28.2 PG
MCHC RBC-ENTMCNC: 34.3 G/DL
MCV RBC AUTO: 82.2 FL
PLATELET # BLD AUTO: 242 K/UL
PMV BLD: 10.8 FL
RBC # BLD: 4.33 M/UL
RBC # FLD: 12.2 %
WBC # FLD AUTO: 3.32 K/UL

## 2018-07-02 ENCOUNTER — APPOINTMENT (OUTPATIENT)
Dept: HEMATOLOGY ONCOLOGY | Facility: CLINIC | Age: 73
End: 2018-07-02

## 2018-07-02 VITALS
DIASTOLIC BLOOD PRESSURE: 77 MMHG | HEART RATE: 64 BPM | HEIGHT: 63 IN | RESPIRATION RATE: 14 BRPM | TEMPERATURE: 96.5 F | SYSTOLIC BLOOD PRESSURE: 177 MMHG | WEIGHT: 154 LBS | BODY MASS INDEX: 27.29 KG/M2

## 2018-07-02 DIAGNOSIS — Z80.1 FAMILY HISTORY OF MALIGNANT NEOPLASM OF TRACHEA, BRONCHUS AND LUNG: ICD-10-CM

## 2018-07-02 DIAGNOSIS — Z80.42 FAMILY HISTORY OF MALIGNANT NEOPLASM OF PROSTATE: ICD-10-CM

## 2018-07-02 DIAGNOSIS — Z80.0 FAMILY HISTORY OF MALIGNANT NEOPLASM OF DIGESTIVE ORGANS: ICD-10-CM

## 2018-07-06 ENCOUNTER — APPOINTMENT (OUTPATIENT)
Dept: INFUSION THERAPY | Facility: CLINIC | Age: 73
End: 2018-07-06

## 2018-07-06 ENCOUNTER — LABORATORY RESULT (OUTPATIENT)
Age: 73
End: 2018-07-06

## 2018-07-06 VITALS
HEART RATE: 62 BPM | SYSTOLIC BLOOD PRESSURE: 153 MMHG | RESPIRATION RATE: 18 BRPM | DIASTOLIC BLOOD PRESSURE: 80 MMHG | TEMPERATURE: 97.2 F

## 2018-07-06 LAB
HCT VFR BLD CALC: 36 %
HGB BLD-MCNC: 12.2 G/DL
MCHC RBC-ENTMCNC: 28.3 PG
MCHC RBC-ENTMCNC: 33.9 G/DL
MCV RBC AUTO: 83.5 FL
PLATELET # BLD AUTO: 161 K/UL
PMV BLD: 10.3 FL
RBC # BLD: 4.31 M/UL
RBC # FLD: 13.3 %
WBC # FLD AUTO: 7.28 K/UL

## 2018-07-06 RX ORDER — DEXAMETHASONE 0.5 MG/5ML
12 ELIXIR ORAL ONCE
Qty: 0 | Refills: 0 | Status: COMPLETED | OUTPATIENT
Start: 2018-07-06 | End: 2018-07-06

## 2018-07-06 RX ORDER — ACETAMINOPHEN 500 MG
650 TABLET ORAL ONCE
Qty: 0 | Refills: 0 | Status: COMPLETED | OUTPATIENT
Start: 2018-07-06 | End: 2018-07-06

## 2018-07-06 RX ORDER — DIPHENHYDRAMINE HCL 50 MG
50 CAPSULE ORAL ONCE
Qty: 0 | Refills: 0 | Status: COMPLETED | OUTPATIENT
Start: 2018-07-06 | End: 2018-07-06

## 2018-07-06 RX ORDER — FAMOTIDINE 10 MG/ML
20 INJECTION INTRAVENOUS ONCE
Qty: 0 | Refills: 0 | Status: COMPLETED | OUTPATIENT
Start: 2018-07-06 | End: 2018-07-06

## 2018-07-06 RX ORDER — PACLITAXEL 6 MG/ML
125 INJECTION, SOLUTION, CONCENTRATE INTRAVENOUS ONCE
Qty: 0 | Refills: 0 | Status: COMPLETED | OUTPATIENT
Start: 2018-07-06 | End: 2018-07-06

## 2018-07-06 RX ORDER — CARBOPLATIN 50 MG
315 VIAL (EA) INTRAVENOUS ONCE
Qty: 0 | Refills: 0 | Status: COMPLETED | OUTPATIENT
Start: 2018-07-06 | End: 2018-07-06

## 2018-07-06 RX ADMIN — Medication 153 MILLIGRAM(S): at 14:20

## 2018-07-06 RX ADMIN — Medication 281.5 MILLIGRAM(S): at 14:55

## 2018-07-06 RX ADMIN — Medication 650 MILLIGRAM(S): at 14:20

## 2018-07-06 RX ADMIN — Medication 650 MILLIGRAM(S): at 10:57

## 2018-07-06 RX ADMIN — PACLITAXEL 270.83 MILLIGRAM(S): 6 INJECTION, SOLUTION, CONCENTRATE INTRAVENOUS at 14:55

## 2018-07-06 RX ADMIN — Medication 183 MILLIGRAM(S): at 14:20

## 2018-07-06 RX ADMIN — FAMOTIDINE 156 MILLIGRAM(S): 10 INJECTION INTRAVENOUS at 14:20

## 2018-07-12 RX ADMIN — Medication 650 MILLIGRAM(S): at 10:56

## 2018-07-13 ENCOUNTER — APPOINTMENT (OUTPATIENT)
Dept: HEMATOLOGY ONCOLOGY | Facility: CLINIC | Age: 73
End: 2018-07-13

## 2018-07-13 ENCOUNTER — APPOINTMENT (OUTPATIENT)
Dept: INFUSION THERAPY | Facility: CLINIC | Age: 73
End: 2018-07-13

## 2018-07-13 ENCOUNTER — LABORATORY RESULT (OUTPATIENT)
Age: 73
End: 2018-07-13

## 2018-07-13 LAB
HCT VFR BLD CALC: 33.1 %
HGB BLD-MCNC: 11.6 G/DL
MCHC RBC-ENTMCNC: 28.9 PG
MCHC RBC-ENTMCNC: 35 G/DL
MCV RBC AUTO: 82.3 FL
PLATELET # BLD AUTO: 111 K/UL
PMV BLD: 11.5 FL
RBC # BLD: 4.02 M/UL
RBC # FLD: 13.1 %
WBC # FLD AUTO: 6.32 K/UL

## 2018-07-13 RX ORDER — ACETAMINOPHEN 500 MG
650 TABLET ORAL ONCE
Qty: 0 | Refills: 0 | Status: COMPLETED | OUTPATIENT
Start: 2018-07-13 | End: 2018-07-13

## 2018-07-13 RX ORDER — DEXAMETHASONE 0.5 MG/5ML
12 ELIXIR ORAL ONCE
Qty: 0 | Refills: 0 | Status: COMPLETED | OUTPATIENT
Start: 2018-07-13 | End: 2018-07-13

## 2018-07-13 RX ORDER — DIPHENHYDRAMINE HCL 50 MG
50 CAPSULE ORAL ONCE
Qty: 0 | Refills: 0 | Status: COMPLETED | OUTPATIENT
Start: 2018-07-13 | End: 2018-07-13

## 2018-07-13 RX ORDER — PEGFILGRASTIM-CBQV 6 MG/.6ML
6 INJECTION, SOLUTION SUBCUTANEOUS ONCE
Qty: 0 | Refills: 0 | Status: COMPLETED | OUTPATIENT
Start: 2018-07-13 | End: 2018-07-13

## 2018-07-13 RX ORDER — CARBOPLATIN 50 MG
315 VIAL (EA) INTRAVENOUS ONCE
Qty: 0 | Refills: 0 | Status: COMPLETED | OUTPATIENT
Start: 2018-07-13 | End: 2018-07-13

## 2018-07-13 RX ORDER — PACLITAXEL 6 MG/ML
125 INJECTION, SOLUTION, CONCENTRATE INTRAVENOUS ONCE
Qty: 0 | Refills: 0 | Status: COMPLETED | OUTPATIENT
Start: 2018-07-13 | End: 2018-07-13

## 2018-07-13 RX ORDER — FAMOTIDINE 10 MG/ML
20 INJECTION INTRAVENOUS ONCE
Qty: 0 | Refills: 0 | Status: COMPLETED | OUTPATIENT
Start: 2018-07-13 | End: 2018-07-13

## 2018-07-13 RX ADMIN — Medication 153 MILLIGRAM(S): at 10:11

## 2018-07-13 RX ADMIN — PACLITAXEL 270.83 MILLIGRAM(S): 6 INJECTION, SOLUTION, CONCENTRATE INTRAVENOUS at 10:54

## 2018-07-13 RX ADMIN — Medication 650 MILLIGRAM(S): at 10:11

## 2018-07-13 RX ADMIN — Medication 281.5 MILLIGRAM(S): at 10:56

## 2018-07-13 RX ADMIN — FAMOTIDINE 156 MILLIGRAM(S): 10 INJECTION INTRAVENOUS at 10:11

## 2018-07-13 RX ADMIN — PEGFILGRASTIM-CBQV 6 MILLIGRAM(S): 6 INJECTION, SOLUTION SUBCUTANEOUS at 12:20

## 2018-07-13 RX ADMIN — Medication 183 MILLIGRAM(S): at 10:11

## 2018-07-17 PROBLEM — C80.1 MALIGNANT (PRIMARY) NEOPLASM, UNSPECIFIED: Chronic | Status: ACTIVE | Noted: 2018-04-18

## 2018-07-27 ENCOUNTER — APPOINTMENT (OUTPATIENT)
Dept: HEMATOLOGY ONCOLOGY | Facility: CLINIC | Age: 73
End: 2018-07-27

## 2018-07-27 ENCOUNTER — APPOINTMENT (OUTPATIENT)
Dept: INFUSION THERAPY | Facility: CLINIC | Age: 73
End: 2018-07-27

## 2018-07-27 ENCOUNTER — LABORATORY RESULT (OUTPATIENT)
Age: 73
End: 2018-07-27

## 2018-07-27 VITALS
RESPIRATION RATE: 16 BRPM | DIASTOLIC BLOOD PRESSURE: 74 MMHG | TEMPERATURE: 95.8 F | HEIGHT: 63 IN | BODY MASS INDEX: 26.4 KG/M2 | SYSTOLIC BLOOD PRESSURE: 135 MMHG | WEIGHT: 149 LBS | HEART RATE: 68 BPM

## 2018-07-27 PROBLEM — K21.9 GASTRO-ESOPHAGEAL REFLUX DISEASE WITHOUT ESOPHAGITIS: Chronic | Status: ACTIVE | Noted: 2018-04-18

## 2018-07-27 PROBLEM — I10 ESSENTIAL (PRIMARY) HYPERTENSION: Chronic | Status: ACTIVE | Noted: 2018-02-22

## 2018-07-27 PROBLEM — E11.9 TYPE 2 DIABETES MELLITUS WITHOUT COMPLICATIONS: Chronic | Status: ACTIVE | Noted: 2018-02-22

## 2018-07-27 PROBLEM — R94.5 ABNORMAL RESULTS OF LIVER FUNCTION STUDIES: Chronic | Status: ACTIVE | Noted: 2018-04-18

## 2018-07-27 PROBLEM — E66.9 OBESITY, UNSPECIFIED: Chronic | Status: ACTIVE | Noted: 2018-04-18

## 2018-07-27 PROBLEM — I25.10 ATHEROSCLEROTIC HEART DISEASE OF NATIVE CORONARY ARTERY WITHOUT ANGINA PECTORIS: Chronic | Status: ACTIVE | Noted: 2018-04-18

## 2018-07-27 PROBLEM — E78.00 PURE HYPERCHOLESTEROLEMIA, UNSPECIFIED: Chronic | Status: ACTIVE | Noted: 2018-02-22

## 2018-07-27 LAB
HCT VFR BLD CALC: 30.8 %
HGB BLD-MCNC: 10.7 G/DL
MCHC RBC-ENTMCNC: 29.5 PG
MCHC RBC-ENTMCNC: 34.7 G/DL
MCV RBC AUTO: 84.8 FL
PLATELET # BLD AUTO: 106 K/UL
PMV BLD: 11.2 FL
RBC # BLD: 3.63 M/UL
RBC # FLD: 16.9 %
WBC # FLD AUTO: 8.15 K/UL

## 2018-07-27 RX ORDER — CARBOPLATIN 50 MG
315 VIAL (EA) INTRAVENOUS ONCE
Qty: 0 | Refills: 0 | Status: COMPLETED | OUTPATIENT
Start: 2018-07-27 | End: 2018-07-27

## 2018-07-27 RX ORDER — FAMOTIDINE 10 MG/ML
20 INJECTION INTRAVENOUS ONCE
Qty: 0 | Refills: 0 | Status: COMPLETED | OUTPATIENT
Start: 2018-07-27 | End: 2018-07-27

## 2018-07-27 RX ORDER — PACLITAXEL 6 MG/ML
125 INJECTION, SOLUTION, CONCENTRATE INTRAVENOUS ONCE
Qty: 0 | Refills: 0 | Status: COMPLETED | OUTPATIENT
Start: 2018-07-27 | End: 2018-07-27

## 2018-07-27 RX ORDER — ACETAMINOPHEN 500 MG
650 TABLET ORAL ONCE
Qty: 0 | Refills: 0 | Status: COMPLETED | OUTPATIENT
Start: 2018-07-27 | End: 2018-07-27

## 2018-07-27 RX ORDER — DIPHENHYDRAMINE HCL 50 MG
50 CAPSULE ORAL ONCE
Qty: 0 | Refills: 0 | Status: COMPLETED | OUTPATIENT
Start: 2018-07-27 | End: 2018-07-27

## 2018-07-27 RX ORDER — DEXAMETHASONE 0.5 MG/5ML
12 ELIXIR ORAL ONCE
Qty: 0 | Refills: 0 | Status: COMPLETED | OUTPATIENT
Start: 2018-07-27 | End: 2018-07-27

## 2018-07-27 RX ADMIN — Medication 650 MILLIGRAM(S): at 12:19

## 2018-07-27 RX ADMIN — Medication 650 MILLIGRAM(S): at 11:30

## 2018-07-27 RX ADMIN — Medication 153 MILLIGRAM(S): at 11:30

## 2018-07-27 RX ADMIN — PACLITAXEL 270.83 MILLIGRAM(S): 6 INJECTION, SOLUTION, CONCENTRATE INTRAVENOUS at 12:18

## 2018-07-27 RX ADMIN — Medication 183 MILLIGRAM(S): at 11:30

## 2018-07-27 RX ADMIN — FAMOTIDINE 156 MILLIGRAM(S): 10 INJECTION INTRAVENOUS at 11:30

## 2018-07-27 RX ADMIN — Medication 281.5 MILLIGRAM(S): at 12:17

## 2018-08-03 ENCOUNTER — LABORATORY RESULT (OUTPATIENT)
Age: 73
End: 2018-08-03

## 2018-08-03 ENCOUNTER — APPOINTMENT (OUTPATIENT)
Dept: HEMATOLOGY ONCOLOGY | Facility: CLINIC | Age: 73
End: 2018-08-03

## 2018-08-03 ENCOUNTER — APPOINTMENT (OUTPATIENT)
Dept: INFUSION THERAPY | Facility: CLINIC | Age: 73
End: 2018-08-03

## 2018-08-03 LAB
ALBUMIN SERPL ELPH-MCNC: 4.6 G/DL
ALP BLD-CCNC: 101 U/L
ALT SERPL-CCNC: 44 U/L
ANION GAP SERPL CALC-SCNC: 23 MMOL/L
AST SERPL-CCNC: 38 U/L
BILIRUB SERPL-MCNC: 1.7 MG/DL
BUN SERPL-MCNC: 22 MG/DL
CALCIUM SERPL-MCNC: 9.4 MG/DL
CHLORIDE SERPL-SCNC: 100 MMOL/L
CO2 SERPL-SCNC: 19 MMOL/L
CREAT SERPL-MCNC: 0.7 MG/DL
GLUCOSE SERPL-MCNC: 175 MG/DL
HCT VFR BLD CALC: 28.7 %
HGB BLD-MCNC: 9.9 G/DL
MCHC RBC-ENTMCNC: 29.4 PG
MCHC RBC-ENTMCNC: 34.5 G/DL
MCV RBC AUTO: 85.2 FL
PLATELET # BLD AUTO: 145 K/UL
PMV BLD: 11.1 FL
POTASSIUM SERPL-SCNC: 4.7 MMOL/L
PROT SERPL-MCNC: 6.8 G/DL
RBC # BLD: 3.37 M/UL
RBC # FLD: 16.2 %
SODIUM SERPL-SCNC: 142 MMOL/L
WBC # FLD AUTO: 4.05 K/UL

## 2018-08-03 RX ORDER — PEGFILGRASTIM-CBQV 6 MG/.6ML
6 INJECTION, SOLUTION SUBCUTANEOUS ONCE
Qty: 0 | Refills: 0 | Status: COMPLETED | OUTPATIENT
Start: 2018-08-03 | End: 2018-08-03

## 2018-08-03 RX ORDER — ERYTHROPOIETIN 10000 [IU]/ML
20000 INJECTION, SOLUTION INTRAVENOUS; SUBCUTANEOUS ONCE
Qty: 0 | Refills: 0 | Status: COMPLETED | OUTPATIENT
Start: 2018-08-03 | End: 2018-08-03

## 2018-08-03 RX ORDER — DEXAMETHASONE 0.5 MG/5ML
12 ELIXIR ORAL ONCE
Qty: 0 | Refills: 0 | Status: COMPLETED | OUTPATIENT
Start: 2018-08-03 | End: 2018-08-03

## 2018-08-03 RX ORDER — PACLITAXEL 6 MG/ML
125 INJECTION, SOLUTION, CONCENTRATE INTRAVENOUS ONCE
Qty: 0 | Refills: 0 | Status: COMPLETED | OUTPATIENT
Start: 2018-08-03 | End: 2018-08-03

## 2018-08-03 RX ORDER — DIPHENHYDRAMINE HCL 50 MG
50 CAPSULE ORAL ONCE
Qty: 0 | Refills: 0 | Status: COMPLETED | OUTPATIENT
Start: 2018-08-03 | End: 2018-08-03

## 2018-08-03 RX ORDER — ACETAMINOPHEN 500 MG
650 TABLET ORAL ONCE
Qty: 0 | Refills: 0 | Status: COMPLETED | OUTPATIENT
Start: 2018-08-03 | End: 2018-08-03

## 2018-08-03 RX ORDER — CARBOPLATIN 50 MG
315 VIAL (EA) INTRAVENOUS ONCE
Qty: 0 | Refills: 0 | Status: COMPLETED | OUTPATIENT
Start: 2018-08-03 | End: 2018-08-03

## 2018-08-03 RX ORDER — FAMOTIDINE 10 MG/ML
20 INJECTION INTRAVENOUS ONCE
Qty: 0 | Refills: 0 | Status: COMPLETED | OUTPATIENT
Start: 2018-08-03 | End: 2018-08-03

## 2018-08-03 RX ADMIN — Medication 153 MILLIGRAM(S): at 10:43

## 2018-08-03 RX ADMIN — PACLITAXEL 270.83 MILLIGRAM(S): 6 INJECTION, SOLUTION, CONCENTRATE INTRAVENOUS at 11:20

## 2018-08-03 RX ADMIN — FAMOTIDINE 156 MILLIGRAM(S): 10 INJECTION INTRAVENOUS at 10:43

## 2018-08-03 RX ADMIN — Medication 650 MILLIGRAM(S): at 10:43

## 2018-08-03 RX ADMIN — Medication 281.5 MILLIGRAM(S): at 11:20

## 2018-08-03 RX ADMIN — Medication 650 MILLIGRAM(S): at 10:44

## 2018-08-03 RX ADMIN — ERYTHROPOIETIN 20000 UNIT(S): 10000 INJECTION, SOLUTION INTRAVENOUS; SUBCUTANEOUS at 14:19

## 2018-08-03 RX ADMIN — Medication 183 MILLIGRAM(S): at 10:43

## 2018-08-03 RX ADMIN — PEGFILGRASTIM-CBQV 6 MILLIGRAM(S): 6 INJECTION, SOLUTION SUBCUTANEOUS at 14:20

## 2018-08-10 ENCOUNTER — APPOINTMENT (OUTPATIENT)
Dept: INFUSION THERAPY | Facility: CLINIC | Age: 73
End: 2018-08-10

## 2018-08-10 ENCOUNTER — LABORATORY RESULT (OUTPATIENT)
Age: 73
End: 2018-08-10

## 2018-08-10 LAB
HCT VFR BLD CALC: 27 %
HGB BLD-MCNC: 9.5 G/DL
MCHC RBC-ENTMCNC: 29.9 PG
MCHC RBC-ENTMCNC: 35.2 G/DL
MCV RBC AUTO: 84.9 FL
PLATELET # BLD AUTO: 208 K/UL
PMV BLD: 11.4 FL
RBC # BLD: 3.18 M/UL
RBC # FLD: 16.8 %
WBC # FLD AUTO: 30.88 K/UL

## 2018-08-10 RX ORDER — ERYTHROPOIETIN 10000 [IU]/ML
20000 INJECTION, SOLUTION INTRAVENOUS; SUBCUTANEOUS ONCE
Qty: 0 | Refills: 0 | Status: COMPLETED | OUTPATIENT
Start: 2018-08-10 | End: 2018-08-10

## 2018-08-10 RX ADMIN — ERYTHROPOIETIN 20000 UNIT(S): 10000 INJECTION, SOLUTION INTRAVENOUS; SUBCUTANEOUS at 13:47

## 2018-08-17 ENCOUNTER — LABORATORY RESULT (OUTPATIENT)
Age: 73
End: 2018-08-17

## 2018-08-17 ENCOUNTER — APPOINTMENT (OUTPATIENT)
Dept: INFUSION THERAPY | Facility: CLINIC | Age: 73
End: 2018-08-17

## 2018-08-17 ENCOUNTER — APPOINTMENT (OUTPATIENT)
Dept: HEMATOLOGY ONCOLOGY | Facility: CLINIC | Age: 73
End: 2018-08-17

## 2018-08-17 VITALS
RESPIRATION RATE: 16 BRPM | BODY MASS INDEX: 26.93 KG/M2 | HEIGHT: 63 IN | DIASTOLIC BLOOD PRESSURE: 97 MMHG | TEMPERATURE: 97.1 F | SYSTOLIC BLOOD PRESSURE: 155 MMHG | HEART RATE: 72 BPM | WEIGHT: 152 LBS

## 2018-08-17 LAB
HCT VFR BLD CALC: 27.3 %
HGB BLD-MCNC: 9.3 G/DL
MCHC RBC-ENTMCNC: 30.9 PG
MCHC RBC-ENTMCNC: 34.1 G/DL
MCV RBC AUTO: 90.7 FL
PLATELET # BLD AUTO: 62 K/UL
PMV BLD: 11.4 FL
RBC # BLD: 3.01 M/UL
RBC # FLD: 21.7 %
WBC # FLD AUTO: 14 K/UL

## 2018-08-17 RX ORDER — OLAPARIB 100 MG/1
100 TABLET, FILM COATED ORAL
Qty: 180 | Refills: 2 | Status: ACTIVE | COMMUNITY
Start: 2018-08-17 | End: 1900-01-01

## 2018-08-17 RX ORDER — ERYTHROPOIETIN 10000 [IU]/ML
20000 INJECTION, SOLUTION INTRAVENOUS; SUBCUTANEOUS ONCE
Qty: 0 | Refills: 0 | Status: COMPLETED | OUTPATIENT
Start: 2018-08-17 | End: 2018-08-17

## 2018-08-17 RX ADMIN — ERYTHROPOIETIN 20000 UNIT(S): 10000 INJECTION, SOLUTION INTRAVENOUS; SUBCUTANEOUS at 11:42

## 2018-08-24 ENCOUNTER — APPOINTMENT (OUTPATIENT)
Dept: INFUSION THERAPY | Facility: CLINIC | Age: 73
End: 2018-08-24

## 2018-08-24 ENCOUNTER — APPOINTMENT (OUTPATIENT)
Dept: HEMATOLOGY ONCOLOGY | Facility: CLINIC | Age: 73
End: 2018-08-24

## 2018-08-24 ENCOUNTER — LABORATORY RESULT (OUTPATIENT)
Age: 73
End: 2018-08-24

## 2018-08-24 RX ORDER — ERYTHROPOIETIN 10000 [IU]/ML
20000 INJECTION, SOLUTION INTRAVENOUS; SUBCUTANEOUS ONCE
Qty: 0 | Refills: 0 | Status: COMPLETED | OUTPATIENT
Start: 2018-08-24 | End: 2018-08-24

## 2018-08-24 RX ADMIN — ERYTHROPOIETIN 20000 UNIT(S): 10000 INJECTION, SOLUTION INTRAVENOUS; SUBCUTANEOUS at 10:47

## 2018-08-28 NOTE — H&P PST ADULT - PSH
Alert-The patient is alert, awake and responds to voice. The patient is oriented to time, place, and person. The triage nurse is able to obtain subjective information. S/P RACHEL-BSO  2012

## 2018-08-31 ENCOUNTER — LABORATORY RESULT (OUTPATIENT)
Age: 73
End: 2018-08-31

## 2018-08-31 ENCOUNTER — APPOINTMENT (OUTPATIENT)
Dept: INFUSION THERAPY | Facility: CLINIC | Age: 73
End: 2018-08-31

## 2018-09-04 LAB
ALBUMIN SERPL ELPH-MCNC: 4.1 G/DL
ALP BLD-CCNC: 86 U/L
ALT SERPL-CCNC: 36 U/L
ANION GAP SERPL CALC-SCNC: 18 MMOL/L
AST SERPL-CCNC: 40 U/L
BILIRUB SERPL-MCNC: 2.3 MG/DL
BUN SERPL-MCNC: 21 MG/DL
CALCIUM SERPL-MCNC: 9.1 MG/DL
CANCER AG125 SERPL-ACNC: 10 U/ML
CHLORIDE SERPL-SCNC: 99 MMOL/L
CO2 SERPL-SCNC: 24 MMOL/L
CREAT SERPL-MCNC: 0.7 MG/DL
GLUCOSE SERPL-MCNC: 222 MG/DL
HCT VFR BLD CALC: 30.1 %
HCT VFR BLD CALC: 35 %
HGB BLD-MCNC: 10 G/DL
HGB BLD-MCNC: 11.5 G/DL
MCHC RBC-ENTMCNC: 31.3 PG
MCHC RBC-ENTMCNC: 31.4 PG
MCHC RBC-ENTMCNC: 32.9 G/DL
MCHC RBC-ENTMCNC: 33.2 G/DL
MCV RBC AUTO: 94.4 FL
MCV RBC AUTO: 95.6 FL
PLATELET # BLD AUTO: 100 K/UL
PLATELET # BLD AUTO: 118 K/UL
PMV BLD: 10 FL
PMV BLD: 10.4 FL
POTASSIUM SERPL-SCNC: 4.4 MMOL/L
PROT SERPL-MCNC: 6.6 G/DL
RBC # BLD: 3.19 M/UL
RBC # BLD: 3.66 M/UL
RBC # FLD: 18.8 %
RBC # FLD: 21.4 %
SODIUM SERPL-SCNC: 141 MMOL/L
WBC # FLD AUTO: 7.7 K/UL
WBC # FLD AUTO: 8.16 K/UL

## 2018-09-07 ENCOUNTER — LABORATORY RESULT (OUTPATIENT)
Age: 73
End: 2018-09-07

## 2018-09-07 ENCOUNTER — APPOINTMENT (OUTPATIENT)
Dept: HEMATOLOGY ONCOLOGY | Facility: CLINIC | Age: 73
End: 2018-09-07

## 2018-09-07 ENCOUNTER — APPOINTMENT (OUTPATIENT)
Dept: INFUSION THERAPY | Facility: CLINIC | Age: 73
End: 2018-09-07

## 2018-09-07 VITALS
BODY MASS INDEX: 26.22 KG/M2 | HEART RATE: 66 BPM | WEIGHT: 148 LBS | TEMPERATURE: 97.3 F | DIASTOLIC BLOOD PRESSURE: 74 MMHG | HEIGHT: 63 IN | SYSTOLIC BLOOD PRESSURE: 135 MMHG

## 2018-09-07 LAB
HCT VFR BLD CALC: 37.8 %
HGB BLD-MCNC: 12.2 G/DL
MCHC RBC-ENTMCNC: 30.7 PG
MCHC RBC-ENTMCNC: 32.3 G/DL
MCV RBC AUTO: 95 FL
PLATELET # BLD AUTO: 102 K/UL
PMV BLD: 9.7 FL
RBC # BLD: 3.98 M/UL
RBC # FLD: 15.7 %
WBC # FLD AUTO: 8.13 K/UL

## 2018-09-08 ENCOUNTER — MESSAGE (OUTPATIENT)
Age: 73
End: 2018-09-08

## 2018-09-10 LAB
ALBUMIN SERPL ELPH-MCNC: 4.5 G/DL
ALP BLD-CCNC: 74 U/L
ALT SERPL-CCNC: 50 U/L
ANION GAP SERPL CALC-SCNC: 21 MMOL/L
AST SERPL-CCNC: 47 U/L
BILIRUB SERPL-MCNC: 2.4 MG/DL
BUN SERPL-MCNC: 23 MG/DL
CALCIUM SERPL-MCNC: 9.9 MG/DL
CANCER AG125 SERPL-ACNC: 9 U/ML
CHLORIDE SERPL-SCNC: 98 MMOL/L
CO2 SERPL-SCNC: 23 MMOL/L
CREAT SERPL-MCNC: 0.9 MG/DL
GLUCOSE SERPL-MCNC: 109 MG/DL
POTASSIUM SERPL-SCNC: 4.3 MMOL/L
PROT SERPL-MCNC: 7.3 G/DL
SODIUM SERPL-SCNC: 142 MMOL/L

## 2018-09-14 ENCOUNTER — APPOINTMENT (OUTPATIENT)
Dept: HEMATOLOGY ONCOLOGY | Facility: CLINIC | Age: 73
End: 2018-09-14

## 2018-09-14 ENCOUNTER — APPOINTMENT (OUTPATIENT)
Dept: INFUSION THERAPY | Facility: CLINIC | Age: 73
End: 2018-09-14

## 2018-09-21 ENCOUNTER — APPOINTMENT (OUTPATIENT)
Dept: INFUSION THERAPY | Facility: CLINIC | Age: 73
End: 2018-09-21

## 2018-10-05 ENCOUNTER — APPOINTMENT (OUTPATIENT)
Dept: HEMATOLOGY ONCOLOGY | Facility: CLINIC | Age: 73
End: 2018-10-05

## 2018-10-05 ENCOUNTER — APPOINTMENT (OUTPATIENT)
Dept: INFUSION THERAPY | Facility: CLINIC | Age: 73
End: 2018-10-05

## 2018-10-05 ENCOUNTER — LABORATORY RESULT (OUTPATIENT)
Age: 73
End: 2018-10-05

## 2018-10-05 ENCOUNTER — OUTPATIENT (OUTPATIENT)
Dept: OUTPATIENT SERVICES | Facility: HOSPITAL | Age: 73
LOS: 1 days | Discharge: HOME | End: 2018-10-05

## 2018-10-05 VITALS
WEIGHT: 149 LBS | BODY MASS INDEX: 26.4 KG/M2 | DIASTOLIC BLOOD PRESSURE: 85 MMHG | RESPIRATION RATE: 16 BRPM | TEMPERATURE: 96.1 F | SYSTOLIC BLOOD PRESSURE: 123 MMHG | HEART RATE: 66 BPM | HEIGHT: 63 IN

## 2018-10-05 DIAGNOSIS — C79.60 SECONDARY MALIGNANT NEOPLASM OF UNSPECIFIED OVARY: ICD-10-CM

## 2018-10-05 DIAGNOSIS — Z51.11 ENCOUNTER FOR ANTINEOPLASTIC CHEMOTHERAPY: ICD-10-CM

## 2018-10-05 DIAGNOSIS — Z90.710 ACQUIRED ABSENCE OF BOTH CERVIX AND UTERUS: Chronic | ICD-10-CM

## 2018-10-05 DIAGNOSIS — Z00.00 ENCOUNTER FOR GENERAL ADULT MEDICAL EXAMINATION W/OUT ABNORMAL FINDINGS: ICD-10-CM

## 2018-10-05 LAB
ALBUMIN SERPL ELPH-MCNC: 4.6 G/DL
ALP BLD-CCNC: 72 U/L
ALT SERPL-CCNC: 55 U/L
ANION GAP SERPL CALC-SCNC: 18 MMOL/L
AST SERPL-CCNC: 46 U/L
BILIRUB SERPL-MCNC: 1 MG/DL
BUN SERPL-MCNC: 34 MG/DL
CALCIUM SERPL-MCNC: 10 MG/DL
CHLORIDE SERPL-SCNC: 104 MMOL/L
CO2 SERPL-SCNC: 20 MMOL/L
CREAT SERPL-MCNC: 0.9 MG/DL
GLUCOSE SERPL-MCNC: 141 MG/DL
HCT VFR BLD CALC: 37.7 %
HGB BLD-MCNC: 12.6 G/DL
MCHC RBC-ENTMCNC: 30.4 PG
MCHC RBC-ENTMCNC: 33.4 G/DL
MCV RBC AUTO: 90.8 FL
PLATELET # BLD AUTO: 138 K/UL
PMV BLD: 9.7 FL
POTASSIUM SERPL-SCNC: 5.3 MMOL/L
PROT SERPL-MCNC: 7.3 G/DL
RBC # BLD: 4.15 M/UL
RBC # FLD: 13.2 %
SODIUM SERPL-SCNC: 142 MMOL/L
WBC # FLD AUTO: 4.94 K/UL

## 2018-10-09 LAB — CANCER AG125 SERPL-ACNC: 8 U/ML

## 2019-01-28 ENCOUNTER — APPOINTMENT (OUTPATIENT)
Dept: HEMATOLOGY ONCOLOGY | Facility: CLINIC | Age: 74
End: 2019-01-28

## 2019-01-28 VITALS
HEIGHT: 63 IN | SYSTOLIC BLOOD PRESSURE: 180 MMHG | RESPIRATION RATE: 14 BRPM | DIASTOLIC BLOOD PRESSURE: 89 MMHG | TEMPERATURE: 96.8 F | BODY MASS INDEX: 28.88 KG/M2 | HEART RATE: 66 BPM | WEIGHT: 163 LBS

## 2019-01-29 NOTE — REASON FOR VISIT
[Follow-Up Visit] : a follow-up [Family Member] : family member [FreeTextEntry2] : Recurrent ovarian carcinoma

## 2019-01-29 NOTE — HISTORY OF PRESENT ILLNESS
[Disease: _____________________] : Disease: [unfilled] [M: ___] : M[unfilled] [AJCC Stage: ____] : AJCC Stage: [unfilled] [de-identified] : The patient is coming for a follow up for her recurrent ovarian carcinoma and continuation of her treatments with Procrit.\par She is denying any particular complaints. She has no pains or aches. She is participating in the different activities offered at the place she is staying.\par The appetite is stable. No problems with urine or bowel movements.\par However, her tolerance to olaparib, which was started because of progressive disease while on carboplatin and Taxol, has been extremely poor with nausea, vomiting, diarrhea, cramps. She had to stop the medication. That was about 2-3 weeks ago. \par Since then , after stopping the medication, she feels great and is remaining active. [de-identified] : 1/28/19\par Patient here for follow up recurrent ovarian cancer. \par She has not been on any treatment since 9/18. She states her energy and appetite have improved, she has gained 20 pounds. \par She denies any abdominal pain/cramps, nausea, vomiting, vaginal pain or bleeding.

## 2019-01-29 NOTE — ASSESSMENT
[FreeTextEntry1] : 72 yo WF with recurrent metastatic ovarian cancer, no longer on treatment due to inability to tolerate olaparib which was started after she failed chemotherapy.\par \par PLAN:\par Recurrent, metastatic ovarian carcinoma, resistant to Taxol and carboplatin, intolerance to olaparib:\par - No clinical evidence of progression. Patient's tumor was not CA-125 producing.\par - Will hold off further scans as patient does not want further treatment at this time. \par - Patient can get routine blood work as per PMD. \par \par \par The situation was discussed with the family and the patient. \par All their questions answered. \par \par Patient seen and examined with Dr Hollis, who agreed with the above assessment and plan.

## 2019-01-29 NOTE — PHYSICAL EXAM
[Restricted in physically strenuous activity but ambulatory and able to carry out work of a light or sedentary nature] : Status 1- Restricted in physically strenuous activity but ambulatory and able to carry out work of a light or sedentary nature, e.g., light house work, office work [Normal] : grossly intact [de-identified] : Overweight [de-identified] : RR, grade II/VI systolic murmur. [de-identified] : Mild distension, not tense  [de-identified] : Arthritic changes are present

## 2019-01-29 NOTE — REVIEW OF SYSTEMS
[Recent Change In Weight] : ~T recent weight change [Negative] : Endocrine [FreeTextEntry2] : Has gained 20 pounds, fatigue has improved. [de-identified] : Mild mental retardation

## 2019-04-08 ENCOUNTER — APPOINTMENT (OUTPATIENT)
Dept: HEMATOLOGY ONCOLOGY | Facility: CLINIC | Age: 74
End: 2019-04-08

## 2019-04-08 ENCOUNTER — LABORATORY RESULT (OUTPATIENT)
Age: 74
End: 2019-04-08

## 2019-04-08 VITALS
DIASTOLIC BLOOD PRESSURE: 89 MMHG | TEMPERATURE: 96 F | SYSTOLIC BLOOD PRESSURE: 159 MMHG | RESPIRATION RATE: 14 BRPM | WEIGHT: 160 LBS | HEART RATE: 70 BPM | HEIGHT: 63 IN | BODY MASS INDEX: 28.35 KG/M2

## 2019-04-09 LAB
ALBUMIN SERPL ELPH-MCNC: 4.7 G/DL
ALP BLD-CCNC: 79 U/L
ALT SERPL-CCNC: 63 U/L
ANION GAP SERPL CALC-SCNC: 16 MMOL/L
AST SERPL-CCNC: 52 U/L
BILIRUB SERPL-MCNC: 1.8 MG/DL
BUN SERPL-MCNC: 25 MG/DL
CALCIUM SERPL-MCNC: 10.2 MG/DL
CHLORIDE SERPL-SCNC: 100 MMOL/L
CO2 SERPL-SCNC: 23 MMOL/L
CREAT SERPL-MCNC: 1 MG/DL
GLUCOSE SERPL-MCNC: 96 MG/DL
HCT VFR BLD CALC: 36.9 %
HGB BLD-MCNC: 12.3 G/DL
MCHC RBC-ENTMCNC: 29 PG
MCHC RBC-ENTMCNC: 33.3 G/DL
MCV RBC AUTO: 87 FL
PLATELET # BLD AUTO: 143 K/UL
PMV BLD: 10.2 FL
POTASSIUM SERPL-SCNC: 4.2 MMOL/L
PROT SERPL-MCNC: 7.6 G/DL
RBC # BLD: 4.24 M/UL
RBC # FLD: 13.2 %
SODIUM SERPL-SCNC: 139 MMOL/L
WBC # FLD AUTO: 8.98 K/UL

## 2019-04-09 NOTE — HISTORY OF PRESENT ILLNESS
[Disease: _____________________] : Disease: [unfilled] [M: ___] : M[unfilled] [de-identified] : The patient is coming for her regularly scheduled follow up for her recurrent ovarian carcinoma resistant to standard chemotherapy, The patient has also been on olaparib but could not tolerate the treatment.\par At the present time, the patient gives contradictory history of abdominal pain from her abdominal wall hernia. Initially she stated that it's hurting her when she sits up but then denied that she had any discomfort. The pain, if truly it was present, is relieved when she lies down. \par Otherwise no cough or shortness of breath. Appetite good. No problems with urine or bowel movement. No fever or night sweats. [AJCC Stage: ____] : AJCC Stage: [unfilled] [de-identified] : Adenocarcinoma

## 2019-04-09 NOTE — REVIEW OF SYSTEMS
[SOB on Exertion] : shortness of breath during exertion [Negative] : Endocrine [de-identified] : Intellectually challenged

## 2019-04-09 NOTE — ASSESSMENT
[FreeTextEntry1] : 1. Recurrent ovarian carcinoma, with intraabdominal and pulmonary metastases, still practically asymptomatic.\par 2. Midline abdominal wall hernia.\par \par The situation was discussed at length with the patient and the family. It was once again decided to be very conservative in terms of management. The goal at this point is to keep her as comfortable as possible causing, if possible, no further complications from chemotherapy or other not absolutely necessary medications.\par \par For the moment, we will just draw a CBC, CMP. Will not order Ca 125 since it has not been very helpful in the past.\par \par All their questions answered.\par \par The patient will be seen again in 3-4 months for follow up and whenever she may need an immediate attention health-wise.

## 2019-04-09 NOTE — REASON FOR VISIT
[Follow-Up Visit] : a follow-up [Family Member] : family member [FreeTextEntry2] : Recurrent ovarian cancer

## 2019-04-09 NOTE — PHYSICAL EXAM
[Restricted in physically strenuous activity but ambulatory and able to carry out work of a light or sedentary nature] : Status 1- Restricted in physically strenuous activity but ambulatory and able to carry out work of a light or sedentary nature, e.g., light house work, office work [Normal] : normal appearance, no rash, nodules, vesicles, ulcers, erythema [de-identified] : But somewhat overweighed [de-identified] : RR, grade II/VI systolic murmur. [de-identified] : Soft but with some voluntary guarding. Ventral hernia, relatively, large present. [de-identified] : Arthritic changes

## 2019-07-29 ENCOUNTER — LABORATORY RESULT (OUTPATIENT)
Age: 74
End: 2019-07-29

## 2019-07-29 ENCOUNTER — OUTPATIENT (OUTPATIENT)
Dept: OUTPATIENT SERVICES | Facility: HOSPITAL | Age: 74
LOS: 1 days | Discharge: HOME | End: 2019-07-29

## 2019-07-29 ENCOUNTER — APPOINTMENT (OUTPATIENT)
Dept: HEMATOLOGY ONCOLOGY | Facility: CLINIC | Age: 74
End: 2019-07-29
Payer: MEDICARE

## 2019-07-29 VITALS
RESPIRATION RATE: 14 BRPM | WEIGHT: 156 LBS | TEMPERATURE: 96.2 F | DIASTOLIC BLOOD PRESSURE: 69 MMHG | HEART RATE: 65 BPM | BODY MASS INDEX: 27.64 KG/M2 | SYSTOLIC BLOOD PRESSURE: 157 MMHG | HEIGHT: 63 IN

## 2019-07-29 DIAGNOSIS — C56.9 MALIGNANT NEOPLASM OF UNSPECIFIED OVARY: ICD-10-CM

## 2019-07-29 DIAGNOSIS — Z90.710 ACQUIRED ABSENCE OF BOTH CERVIX AND UTERUS: Chronic | ICD-10-CM

## 2019-07-29 DIAGNOSIS — C79.60 SECONDARY MALIGNANT NEOPLASM OF UNSPECIFIED OVARY: ICD-10-CM

## 2019-07-29 PROCEDURE — 99213 OFFICE O/P EST LOW 20 MIN: CPT

## 2019-07-30 LAB
ALBUMIN SERPL ELPH-MCNC: 4.5 G/DL
ALP BLD-CCNC: 79 U/L
ALT SERPL-CCNC: 55 U/L
ANION GAP SERPL CALC-SCNC: 14 MMOL/L
AST SERPL-CCNC: 33 U/L
BILIRUB SERPL-MCNC: 1.2 MG/DL
BUN SERPL-MCNC: 28 MG/DL
CA-I SERPL-SCNC: 1.26 MMOL/L
CALCIUM SERPL-MCNC: 10.1 MG/DL
CALCIUM SERPL-MCNC: 10.3 MG/DL
CANCER AG125 SERPL-ACNC: 8 U/ML
CHLORIDE SERPL-SCNC: 102 MMOL/L
CO2 SERPL-SCNC: 24 MMOL/L
CREAT SERPL-MCNC: 0.9 MG/DL
GLUCOSE SERPL-MCNC: 106 MG/DL
HCT VFR BLD CALC: 38.1 %
HGB BLD-MCNC: 12.6 G/DL
MCHC RBC-ENTMCNC: 29 PG
MCHC RBC-ENTMCNC: 33.1 G/DL
MCV RBC AUTO: 87.6 FL
PARATHYROID HORMONE INTACT: 34 PG/ML
PLATELET # BLD AUTO: 160 K/UL
PMV BLD: 10.4 FL
POTASSIUM SERPL-SCNC: 4.3 MMOL/L
PROT SERPL-MCNC: 7.8 G/DL
RBC # BLD: 4.35 M/UL
RBC # FLD: 13.3 %
SODIUM SERPL-SCNC: 140 MMOL/L
WBC # FLD AUTO: 9.33 K/UL

## 2019-07-30 NOTE — REASON FOR VISIT
[Family Member] : family member [Follow-Up Visit] : a follow-up [FreeTextEntry2] : Recurrent ovarian cancer

## 2019-07-30 NOTE — REVIEW OF SYSTEMS
[SOB on Exertion] : shortness of breath during exertion [Negative] : Heme/Lymph [de-identified] : Intellectually challenged

## 2019-07-30 NOTE — ASSESSMENT
[FreeTextEntry1] : 1. Recurrent ovarian carcinoma, with intraabdominal and pulmonary metastases, still practically asymptomatic.\par 2. Midline abdominal wall hernia.\par \par The situation was discussed at length with the patient and the family. It was once again decided to be very conservative in terms of management. \par Discussed the option of using Niraparib which was noted to have less GI side effects in clinical trials. \par Printed information was given to family. Will not repeat imaging as, at the moment, the goal is to keep her comfortable. \par In the event they decide to proceed with starting niraparib, then will get baseline imaging. \par \par - We will just draw a CBC, CMP, PTH and ionized calcium ( as CA was 10.2 last visit). Ca 125 has not been helpful in this case.\par \par All their questions answered.\par \par The patient will be seen again in 3-4 months for follow up and whenever she may need an immediate attention health-wise.\par \par Patient seen and examined with Dr. Hollis who agreed with the above.

## 2019-07-30 NOTE — HISTORY OF PRESENT ILLNESS
[Disease: _____________________] : Disease: [unfilled] [M: ___] : M[unfilled] [AJCC Stage: ____] : AJCC Stage: [unfilled] [de-identified] : Adenocarcinoma [de-identified] : The patient is coming for her regularly scheduled follow up for her recurrent ovarian carcinoma resistant to standard chemotherapy, The patient has also been on olaparib but could not tolerate the treatment.\par Today, she reports feeling well. Denies fever, cough, SOB. Appetite good. No problems with urine or bowel movement\par She has areas of mild erythema over right knee and face. Denies itching. \par

## 2019-07-30 NOTE — PHYSICAL EXAM
[Restricted in physically strenuous activity but ambulatory and able to carry out work of a light or sedentary nature] : Status 1- Restricted in physically strenuous activity but ambulatory and able to carry out work of a light or sedentary nature, e.g., light house work, office work [Normal] : normal appearance, no rash, nodules, vesicles, ulcers, erythema [de-identified] : But somewhat overweighed [de-identified] : RR, grade II/VI systolic murmur. [de-identified] : Soft but with some voluntary guarding. Ventral hernia, relatively large, present. [de-identified] : Arthritic changes

## 2019-12-23 ENCOUNTER — APPOINTMENT (OUTPATIENT)
Dept: HEMATOLOGY ONCOLOGY | Facility: CLINIC | Age: 74
End: 2019-12-23

## 2020-01-20 ENCOUNTER — LABORATORY RESULT (OUTPATIENT)
Age: 75
End: 2020-01-20

## 2020-01-20 ENCOUNTER — APPOINTMENT (OUTPATIENT)
Dept: HEMATOLOGY ONCOLOGY | Facility: CLINIC | Age: 75
End: 2020-01-20
Payer: MEDICARE

## 2020-01-20 ENCOUNTER — OUTPATIENT (OUTPATIENT)
Dept: OUTPATIENT SERVICES | Facility: HOSPITAL | Age: 75
LOS: 1 days | Discharge: HOME | End: 2020-01-20

## 2020-01-20 VITALS
WEIGHT: 159 LBS | HEART RATE: 72 BPM | BODY MASS INDEX: 28.17 KG/M2 | HEIGHT: 63 IN | DIASTOLIC BLOOD PRESSURE: 72 MMHG | SYSTOLIC BLOOD PRESSURE: 152 MMHG | TEMPERATURE: 97.4 F | RESPIRATION RATE: 14 BRPM

## 2020-01-20 DIAGNOSIS — Z90.710 ACQUIRED ABSENCE OF BOTH CERVIX AND UTERUS: Chronic | ICD-10-CM

## 2020-01-20 LAB
HCT VFR BLD CALC: 36.3 %
HGB BLD-MCNC: 12.2 G/DL
MCHC RBC-ENTMCNC: 28.8 PG
MCHC RBC-ENTMCNC: 33.6 G/DL
MCV RBC AUTO: 85.8 FL
PLATELET # BLD AUTO: 176 K/UL
PMV BLD: 10.4 FL
RBC # BLD: 4.23 M/UL
RBC # FLD: 13.2 %
WBC # FLD AUTO: 10.05 K/UL

## 2020-01-20 PROCEDURE — 99213 OFFICE O/P EST LOW 20 MIN: CPT

## 2020-01-21 LAB
ALBUMIN SERPL ELPH-MCNC: 4.5 G/DL
ALP BLD-CCNC: 78 U/L
ALT SERPL-CCNC: 50 U/L
ANION GAP SERPL CALC-SCNC: 17 MMOL/L
AST SERPL-CCNC: 41 U/L
BILIRUB SERPL-MCNC: 0.8 MG/DL
BUN SERPL-MCNC: 37 MG/DL
CALCIUM SERPL-MCNC: 9.8 MG/DL
CANCER AG125 SERPL-ACNC: 10 U/ML
CHLORIDE SERPL-SCNC: 101 MMOL/L
CO2 SERPL-SCNC: 20 MMOL/L
CREAT SERPL-MCNC: 1 MG/DL
ESTIMATED AVERAGE GLUCOSE: 143 MG/DL
GLUCOSE SERPL-MCNC: 114 MG/DL
HBA1C MFR BLD HPLC: 6.6 %
POTASSIUM SERPL-SCNC: 4.7 MMOL/L
PROT SERPL-MCNC: 7.1 G/DL
SODIUM SERPL-SCNC: 138 MMOL/L

## 2020-01-21 NOTE — HISTORY OF PRESENT ILLNESS
[Disease: _____________________] : Disease: [unfilled] [M: ___] : M[unfilled] [AJCC Stage: ____] : AJCC Stage: [unfilled] [de-identified] : The patient is coming for her regularly scheduled follow up for her recurrent ovarian carcinoma.\par Upon recurrence, the patient was put back on carboplatin and paclitaxel but the disease continued to progress. She was switched to olaparib which she couldn't tolerate, therefore that was discontinued and it was decided to keep the patient as comfortable as possible.\par Presently, she is still denying any particular complaints. Her appetite is good. She has no problems with urine or bowel movements. No nausea or vomiting. she is remaining active with the group home. 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

## 2020-01-21 NOTE — REVIEW OF SYSTEMS
[SOB on Exertion] : shortness of breath during exertion [Recent Change In Weight] : ~T recent weight change [FreeTextEntry2] : Continuing to gain weight [Negative] : Heme/Lymph [FreeTextEntry7] : S [de-identified] : Mild mental retardation

## 2020-01-21 NOTE — ASSESSMENT
[FreeTextEntry1] : Recurrent ovarian carcinoma, resistant to chemotherapy, poor tolerance to olaparib, presently virtually asymptomatic.\par We will draw a CBC, CMP and Ca 125 (although in the past Ca 125 has not been helpful).\par Further recommendations after the above results are available.\par \par We had already decided with the agreement of the family not to pursue radiological studies in the absence of any symptoms since her disease had become resistant and the goal at this stage is mostly her comfort.\par \par All questions answered.\par \par Further recommendations as needed after the above results are available. Follow up in 6 months, or sooner if new problems in order to be addressed symptomatically.

## 2020-01-21 NOTE — REASON FOR VISIT
[Follow-Up Visit] : a follow-up [Family Member] : family member [FreeTextEntry2] : Ovarian carcinoma

## 2020-01-21 NOTE — PHYSICAL EXAM
[Restricted in physically strenuous activity but ambulatory and able to carry out work of a light or sedentary nature] : Status 1- Restricted in physically strenuous activity but ambulatory and able to carry out work of a light or sedentary nature, e.g., light house work, office work [Obese] : obese [Normal] : grossly intact [de-identified] : Soft but with mild voluntary guarding, no obvious masses palpated. [de-identified] : RR, grade II/VI systolic murmur [de-identified] : Arthritic changes present

## 2020-01-22 ENCOUNTER — MOBILE ON CALL (OUTPATIENT)
Age: 75
End: 2020-01-22

## 2020-01-31 DIAGNOSIS — C56.9 MALIGNANT NEOPLASM OF UNSPECIFIED OVARY: ICD-10-CM

## 2020-06-15 ENCOUNTER — OUTPATIENT (OUTPATIENT)
Dept: OUTPATIENT SERVICES | Facility: HOSPITAL | Age: 75
LOS: 1 days | Discharge: HOME | End: 2020-06-15

## 2020-06-15 ENCOUNTER — LABORATORY RESULT (OUTPATIENT)
Age: 75
End: 2020-06-15

## 2020-06-15 ENCOUNTER — APPOINTMENT (OUTPATIENT)
Dept: HEMATOLOGY ONCOLOGY | Facility: CLINIC | Age: 75
End: 2020-06-15
Payer: MEDICARE

## 2020-06-15 VITALS
RESPIRATION RATE: 14 BRPM | BODY MASS INDEX: 27.29 KG/M2 | HEART RATE: 60 BPM | TEMPERATURE: 97.3 F | WEIGHT: 154 LBS | SYSTOLIC BLOOD PRESSURE: 154 MMHG | HEIGHT: 63 IN | DIASTOLIC BLOOD PRESSURE: 73 MMHG

## 2020-06-15 DIAGNOSIS — Z90.710 ACQUIRED ABSENCE OF BOTH CERVIX AND UTERUS: Chronic | ICD-10-CM

## 2020-06-15 LAB
HCT VFR BLD CALC: 37.7 %
HGB BLD-MCNC: 12.3 G/DL
MCHC RBC-ENTMCNC: 28.5 PG
MCHC RBC-ENTMCNC: 32.6 G/DL
MCV RBC AUTO: 87.5 FL
PLATELET # BLD AUTO: 154 K/UL
PMV BLD: 10.1 FL
RBC # BLD: 4.31 M/UL
RBC # FLD: 13.2 %
WBC # FLD AUTO: 8.43 K/UL

## 2020-06-15 PROCEDURE — 99213 OFFICE O/P EST LOW 20 MIN: CPT

## 2020-06-16 LAB
ALBUMIN SERPL ELPH-MCNC: 4.9 G/DL
ALP BLD-CCNC: 64 U/L
ALT SERPL-CCNC: 30 U/L
ANION GAP SERPL CALC-SCNC: 17 MMOL/L
AST SERPL-CCNC: 25 U/L
BILIRUB SERPL-MCNC: 1 MG/DL
BUN SERPL-MCNC: 25 MG/DL
CALCIUM SERPL-MCNC: 10.2 MG/DL
CANCER AG125 SERPL-ACNC: 12 U/ML
CHLORIDE SERPL-SCNC: 102 MMOL/L
CO2 SERPL-SCNC: 24 MMOL/L
CREAT SERPL-MCNC: 0.9 MG/DL
GLUCOSE SERPL-MCNC: 78 MG/DL
POTASSIUM SERPL-SCNC: 4.7 MMOL/L
PROT SERPL-MCNC: 7.2 G/DL
SODIUM SERPL-SCNC: 143 MMOL/L

## 2020-06-16 NOTE — REVIEW OF SYSTEMS
[Recent Change In Weight] : ~T recent weight change [Joint Pain] : joint pain [Insomnia] : insomnia [Negative] : Heme/Lymph [FreeTextEntry2] : Has gained some

## 2020-06-16 NOTE — PHYSICAL EXAM
[Restricted in physically strenuous activity but ambulatory and able to carry out work of a light or sedentary nature] : Status 1- Restricted in physically strenuous activity but ambulatory and able to carry out work of a light or sedentary nature, e.g., light house work, office work [Normal] : grossly intact [de-identified] : RR, grade II/VI systolic murmur, unchanged. [de-identified] : But somewhat overweight [de-identified] : Arthritic changes noted. [de-identified] : But some mental retardation

## 2020-06-16 NOTE — REASON FOR VISIT
[Follow-Up Visit] : a follow-up [Family Member] : family member [FreeTextEntry2] : Ovarian carcinoma with carcinomatosis

## 2020-06-16 NOTE — ASSESSMENT
[FreeTextEntry1] : Recurrent ovarian carcinoma with peritoneal carcinomatosis, on no anticancer therapy, being observed for supportive and palliative care.\par We will draw a CBC, CMP, Hgb A1c, Ca 125. Further recommendations, as needed, after the above completed.\par \par All questions answered.\par \par If the above stable, the patient will be seen again in 6 months for follow up and as needed.

## 2020-06-16 NOTE — HISTORY OF PRESENT ILLNESS
[Disease: _____________________] : Disease: [unfilled] [AJCC Stage: ____] : AJCC Stage: [unfilled] [de-identified] : The patient is coming for her regularly scheduled follow up for her ovarian carcinoma with peritoneal carcinomatosis.\par Presently she is on no treatment. It was decided with the agreement of the family not to treat the patient any more since she had become resistant to chemotherapy with carboplatin and paclitaxel and did not tolerate olaparib.\par The patient has moved in with her family. Initially, she had significant adaptation problem not being able to sleep and was eventually given Halcion with good results.\par The patient is not a good historian but able to communicate somewhat. She is denying any particular complaints presently. The appetite is stable. no fever or night sweats. No new abdominal pain. No significant problems with urine or bowel movements.

## 2020-06-17 LAB — TSH SERPL-ACNC: 1.91 UIU/ML

## 2020-06-19 DIAGNOSIS — C76.2 MALIGNANT NEOPLASM OF ABDOMEN: ICD-10-CM

## 2020-06-19 DIAGNOSIS — C56.9 MALIGNANT NEOPLASM OF UNSPECIFIED OVARY: ICD-10-CM

## 2020-11-09 ENCOUNTER — APPOINTMENT (OUTPATIENT)
Dept: HEMATOLOGY ONCOLOGY | Facility: CLINIC | Age: 75
End: 2020-11-09
Payer: MEDICARE

## 2020-11-09 ENCOUNTER — OUTPATIENT (OUTPATIENT)
Dept: OUTPATIENT SERVICES | Facility: HOSPITAL | Age: 75
LOS: 1 days | Discharge: HOME | End: 2020-11-09

## 2020-11-09 VITALS
SYSTOLIC BLOOD PRESSURE: 159 MMHG | BODY MASS INDEX: 26.4 KG/M2 | TEMPERATURE: 98.8 F | WEIGHT: 149 LBS | HEIGHT: 63 IN | DIASTOLIC BLOOD PRESSURE: 66 MMHG | HEART RATE: 73 BPM

## 2020-11-09 DIAGNOSIS — Z90.710 ACQUIRED ABSENCE OF BOTH CERVIX AND UTERUS: Chronic | ICD-10-CM

## 2020-11-09 PROCEDURE — 99213 OFFICE O/P EST LOW 20 MIN: CPT

## 2020-11-10 NOTE — REVIEW OF SYSTEMS
[Fatigue] : fatigue [Recent Change In Weight] : ~T recent weight change [Loss of Hearing] : loss of hearing [Abdominal Pain] : abdominal pain [Diarrhea] : diarrhea [Anxiety] : anxiety [Negative] : Heme/Lymph [de-identified] : Limited intellectual ability

## 2020-11-10 NOTE — ASSESSMENT
[FreeTextEntry1] : Relapsed ovarian carcinoma, mostly on palliative care, progressing.\par The situation was discussed with them. We can either be conservative trying to control her symptoms using Peptobismol occasionally or even Imodium carefully if the diarrhea becomes more frequent. The patient may slao use analgesics starting with milder ones. \par We discussed the appropriateness of Hospice to which they agreed.\par Another suggestion was to get a CT scan of the abdomen and pelvis and consider paracentesis if significant ascites.\par At this time, they will explore the option of Hospice.\par \par All their questions answered.\par \par F/U as needed.

## 2020-11-10 NOTE — HISTORY OF PRESENT ILLNESS
[Disease: _____________________] : Disease: [unfilled] [de-identified] : The patient's grand-daughter called earlier stating if she could bring the patient since she has been complaining of mid-abdominal pain/cramps for a few days now. \par The patient's pain is of intermediate intensity. She is not using any particular pain killers. She is denying any N/V. The appetite has decreased. She has developed diarrhea, once or twice a day, over the last several days. The grand-daughter has also noted some bulging of the umbilicus around which the pain seems to be concentrated.\par She is denying any fever. No other pains or aches. No night sweats.  [de-identified] : Abdominal carcinomatosis

## 2020-11-10 NOTE — PHYSICAL EXAM
[Ambulatory and capable of all self care but unable to carry out any work activities] : Status 2- Ambulatory and capable of all self care but unable to carry out any work activities. Up and about more than 50% of waking hours [Normal] : grossly intact [de-identified] : But somewhat overweight [de-identified] : RR. Grade II/VI systolic murmur [de-identified] : Soft. A definite hard mass  measuring about 5 cm at the umbilicus with a smaller satellite lesion underneath.  [de-identified] : Some arthritic changes. [de-identified] : Except for mental ability, although communicating and answering simple questions.

## 2020-11-11 DIAGNOSIS — C56.9 MALIGNANT NEOPLASM OF UNSPECIFIED OVARY: ICD-10-CM

## 2020-11-11 DIAGNOSIS — C76.2 MALIGNANT NEOPLASM OF ABDOMEN: ICD-10-CM

## 2020-12-14 ENCOUNTER — APPOINTMENT (OUTPATIENT)
Dept: HEMATOLOGY ONCOLOGY | Facility: CLINIC | Age: 75
End: 2020-12-14

## 2020-12-28 RX ORDER — OXYCODONE AND ACETAMINOPHEN 5; 325 MG/1; MG/1
5-325 TABLET ORAL EVERY 6 HOURS
Qty: 20 | Refills: 0 | Status: ACTIVE | COMMUNITY
Start: 2020-12-28 | End: 1900-01-01

## 2020-12-29 RX ORDER — CEPHALEXIN 500 MG/1
500 TABLET ORAL
Qty: 10 | Refills: 0 | Status: ACTIVE | COMMUNITY
Start: 2020-12-29 | End: 1900-01-01

## 2021-01-05 ENCOUNTER — NON-APPOINTMENT (OUTPATIENT)
Age: 76
End: 2021-01-05

## 2021-01-18 ENCOUNTER — APPOINTMENT (OUTPATIENT)
Dept: HEMATOLOGY ONCOLOGY | Facility: CLINIC | Age: 76
End: 2021-01-18

## 2021-03-19 ENCOUNTER — TRANSCRIPTION ENCOUNTER (OUTPATIENT)
Age: 76
End: 2021-03-19

## 2021-07-13 NOTE — H&P PST ADULT - CORNEAL ABRASION RISK
Update: plans return to [place] when medically cleared (IV AB continued), Urology okay for discharge, Attending plans discharge in am; collaborated w OZZIE James  Electronically signed by Luan Mcgowan RN on 7/13/2021 at 2:03 PM denies

## 2023-08-31 NOTE — ASU PREOP CHECKLIST - HIBICLENS SHOWER 1 DATE
Pt seen today by: Dr. Brant MD    Supplies orderd on 6/29/2023 from [facility]; pt received his supplies.    Self care DRESSING INSTRUCTIONS:      Wound location: Left anterior/ Posterior Leg  Cleanse wound with wound cleanser or saline  Apply Promogran to wound bed  Cover with exudry or ABD pad  Secure with kerix and cover-all tape  Dressings to be changed Every Other Day and as needed if soiled or not intact    Wound location: Rt leg  Cleanse cleanse with soap and water  Pat dry  Moisturize any dry skin    Wound location: Lt foot  Cleanse wound with wound cleanser or Normal saline  Apply large bandaid  Change every other day  Moisturize dry skin on feet    Return visit:  Thursday, September 7, 2023 at 1:30 pm.    Wound may have been debrided in clinic: if so, WHAT YOU NEED TO KNOW:  Debridement is the removal of infected, damaged, or dead tissue so a wound can heal properly. Your wound may need more than one debridement. Debridement can cause bleeding, and a small amount of blood is expected.  AFTER A DEBRIDEMENT:  Keep your wound clean and dry. Do not remove the dressing unless instructed.  Follow the wound care orders provided to you or your home health care provider.  If you have pain, take over the counter pain relievers or pain medication if prescribed.  Elevate the wound and limit excessive activity to prevent bleeding and/or swelling in your wound.  If you see blood coming through the dressing, apply gauze and tape over the dressing and hold firm pressure to the wound with your hand for 5-10 minutes continuously, without peeking, to help the bleeding stop.    Contact [facility] wound care team at [phone] or go to the nearest Emergency department if:  You have a fever greater than 101 taken by mouth.  Your pain gets worse or does not go away, even after taking your regular pain medicine.  Your skin around your wound is red, hot, swollen, or draining pus.  You have bleeding that continues to come through the  dressing after holding pressure for 10 minutes     Nutrition:  The current daily value (%DV) for protein is 50 grams per day and is meant as a general goal for most people. Further increasing your dietary protein intake is very important for wound healing. Typically one needs over 100g of protein per day to help with wound healing needs.  If you are a dialysis patient or have problems with your kidneys, talk to your Nephrologist about how much protein you can take in with your condition.  Examples of high protein items that can be added to your diet include: eggs, chicken, red meats, almonds, cottage cheese, Greek yogurt, beans, and peanut butter.  Fortified protein bars, shakes and drinks can add 15-30 additional grams of protein per serving.   Also add:   1 daily general multivitamin   Uri : 1 packet twice daily   Vitamin C : 500mg twice daily   Zinc 220 mg daily  Vit D : once daily    Offloading:  Offload your wound. This means to reduce pressure on and around the wound that reduces blood flow to the wound and prevents healing. Your wound care team will discuss specific ways for you to offload your specific wound. Common offloading strategies include:  Turn or reposition every 2 hours or sooner  Use pillows, wedges, ROHO wheelchair cushions or other special devices like boots and shoes to lift the wound off of hard surfaces  Alternating Low Air-loss (ALAL) mattress may be ordered  Padded dressings can reduce wound pressure      Call our [facility] wound clinic for questions/concerns a [phone] .   26-Apr-2018

## 2024-05-14 NOTE — H&P PST ADULT - ANESTHESIA, PREVIOUS REACTION, PROFILE
Dorothy is calling back to ask about scheduling an injection.  She states she was scheduled but the appointment was canceled and she wanted to find out what is going on.  Thank you.  
Message sent to Mercedes Rios to follow up on injection auth status.   
Per our pre cert department injection authorization is still pending and in clinical review. Once auth has been received patient may be rescheduled.  
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